# Patient Record
Sex: FEMALE | Race: WHITE | NOT HISPANIC OR LATINO | Employment: OTHER
[De-identification: names, ages, dates, MRNs, and addresses within clinical notes are randomized per-mention and may not be internally consistent; named-entity substitution may affect disease eponyms.]

---

## 2018-02-07 ENCOUNTER — RECORDS - HEALTHEAST (OUTPATIENT)
Dept: ADMINISTRATIVE | Facility: OTHER | Age: 83
End: 2018-02-07

## 2019-02-06 ENCOUNTER — RECORDS - HEALTHEAST (OUTPATIENT)
Dept: ADMINISTRATIVE | Facility: OTHER | Age: 84
End: 2019-02-06

## 2019-02-06 LAB
ALT SERPL W/O P-5'-P-CCNC: 26 U/L (ref 12–63)
AST SERPL-CCNC: 20 U/L (ref 10–37)
CHOLEST SERPL-MCNC: 175 MG/DL
CREAT SERPL-MCNC: 0.7 MG/DL (ref 0.6–1.3)
GFR ESTIMATE EXT - HISTORICAL: >60 ML/MIN/1.73M2
HDLC SERPL-MCNC: 94 MG/DL
LDLC SERPL CALC-MCNC: 69 MG/DL
NON HDL CHOL. (LDL+VLDL): 81
TRIGLYCERIDES (HISTORICAL CONVERSION): 58 MG/DL

## 2019-02-13 ENCOUNTER — RECORDS - HEALTHEAST (OUTPATIENT)
Dept: ADMINISTRATIVE | Facility: OTHER | Age: 84
End: 2019-02-13

## 2019-07-24 ENCOUNTER — RECORDS - HEALTHEAST (OUTPATIENT)
Dept: ADMINISTRATIVE | Facility: OTHER | Age: 84
End: 2019-07-24

## 2019-08-07 ENCOUNTER — RECORDS - HEALTHEAST (OUTPATIENT)
Dept: ADMINISTRATIVE | Facility: OTHER | Age: 84
End: 2019-08-07

## 2019-09-04 ENCOUNTER — RECORDS - HEALTHEAST (OUTPATIENT)
Dept: ADMINISTRATIVE | Facility: OTHER | Age: 84
End: 2019-09-04

## 2020-03-05 ENCOUNTER — COMMUNICATION - HEALTHEAST (OUTPATIENT)
Dept: INTERNAL MEDICINE | Facility: CLINIC | Age: 85
End: 2020-03-05

## 2020-03-19 ENCOUNTER — OFFICE VISIT - HEALTHEAST (OUTPATIENT)
Dept: INTERNAL MEDICINE | Facility: CLINIC | Age: 85
End: 2020-03-19

## 2020-03-19 DIAGNOSIS — I10 ESSENTIAL HYPERTENSION: ICD-10-CM

## 2020-03-19 DIAGNOSIS — R19.7 DIARRHEA, UNSPECIFIED TYPE: ICD-10-CM

## 2020-03-19 DIAGNOSIS — M79.675 PAIN OF TOE OF LEFT FOOT: ICD-10-CM

## 2020-03-19 DIAGNOSIS — E78.2 MIXED HYPERLIPIDEMIA: ICD-10-CM

## 2020-03-19 DIAGNOSIS — L85.8 KERATOACANTHOMA: ICD-10-CM

## 2020-03-19 DIAGNOSIS — Z76.89 ENCOUNTER TO ESTABLISH CARE WITH NEW DOCTOR: ICD-10-CM

## 2020-03-19 DIAGNOSIS — Z78.9 NONSMOKER: ICD-10-CM

## 2020-03-19 DIAGNOSIS — N32.81 OAB (OVERACTIVE BLADDER): ICD-10-CM

## 2020-03-19 LAB
ALBUMIN SERPL-MCNC: 4.2 G/DL (ref 3.5–5)
ALP SERPL-CCNC: 100 U/L (ref 45–120)
ALT SERPL W P-5'-P-CCNC: 27 U/L (ref 0–45)
ANION GAP SERPL CALCULATED.3IONS-SCNC: 12 MMOL/L (ref 5–18)
AST SERPL W P-5'-P-CCNC: 30 U/L (ref 0–40)
BASOPHILS # BLD AUTO: 0 THOU/UL (ref 0–0.2)
BASOPHILS NFR BLD AUTO: 1 % (ref 0–2)
BILIRUB SERPL-MCNC: 0.9 MG/DL (ref 0–1)
BUN SERPL-MCNC: 13 MG/DL (ref 8–28)
C REACTIVE PROTEIN LHE: 0.3 MG/DL (ref 0–0.8)
CALCIUM SERPL-MCNC: 9.2 MG/DL (ref 8.5–10.5)
CHLORIDE BLD-SCNC: 94 MMOL/L (ref 98–107)
CHOLEST SERPL-MCNC: 174 MG/DL
CO2 SERPL-SCNC: 26 MMOL/L (ref 22–31)
CREAT SERPL-MCNC: 0.67 MG/DL (ref 0.6–1.1)
EOSINOPHIL # BLD AUTO: 0.1 THOU/UL (ref 0–0.4)
EOSINOPHIL NFR BLD AUTO: 2 % (ref 0–6)
ERYTHROCYTE [DISTWIDTH] IN BLOOD BY AUTOMATED COUNT: 10.6 % (ref 11–14.5)
ERYTHROCYTE [SEDIMENTATION RATE] IN BLOOD BY WESTERGREN METHOD: 8 MM/HR (ref 0–20)
FASTING STATUS PATIENT QL REPORTED: NO
GFR SERPL CREATININE-BSD FRML MDRD: >60 ML/MIN/1.73M2
GLUCOSE BLD-MCNC: 97 MG/DL (ref 70–125)
HCT VFR BLD AUTO: 43 % (ref 35–47)
HDLC SERPL-MCNC: 95 MG/DL
HGB BLD-MCNC: 14.6 G/DL (ref 12–16)
LDLC SERPL CALC-MCNC: 70 MG/DL
LYMPHOCYTES # BLD AUTO: 1.4 THOU/UL (ref 0.8–4.4)
LYMPHOCYTES NFR BLD AUTO: 22 % (ref 20–40)
MCH RBC QN AUTO: 33.6 PG (ref 27–34)
MCHC RBC AUTO-ENTMCNC: 33.9 G/DL (ref 32–36)
MCV RBC AUTO: 99 FL (ref 80–100)
MONOCYTES # BLD AUTO: 0.3 THOU/UL (ref 0–0.9)
MONOCYTES NFR BLD AUTO: 5 % (ref 2–10)
NEUTROPHILS # BLD AUTO: 4.2 THOU/UL (ref 2–7.7)
NEUTROPHILS NFR BLD AUTO: 70 % (ref 50–70)
PLATELET # BLD AUTO: 286 THOU/UL (ref 140–440)
PMV BLD AUTO: 7.3 FL (ref 7–10)
POTASSIUM BLD-SCNC: 4.1 MMOL/L (ref 3.5–5)
PROT SERPL-MCNC: 7.1 G/DL (ref 6–8)
RBC # BLD AUTO: 4.34 MILL/UL (ref 3.8–5.4)
SODIUM SERPL-SCNC: 132 MMOL/L (ref 136–145)
TRIGL SERPL-MCNC: 45 MG/DL
URATE SERPL-MCNC: 3.1 MG/DL (ref 2–7.5)
WBC: 6.1 THOU/UL (ref 4–11)

## 2020-03-19 ASSESSMENT — MIFFLIN-ST. JEOR: SCORE: 939.35

## 2020-03-24 ENCOUNTER — COMMUNICATION - HEALTHEAST (OUTPATIENT)
Dept: INTERNAL MEDICINE | Facility: CLINIC | Age: 85
End: 2020-03-24

## 2020-03-24 RX ORDER — LOPERAMIDE HCL 2 MG
2 CAPSULE ORAL
Status: SHIPPED | COMMUNITY
Start: 2020-03-24

## 2020-03-24 RX ORDER — ROSUVASTATIN CALCIUM 5 MG/1
5 TABLET, COATED ORAL
Qty: 36 TABLET | Refills: 3 | Status: SHIPPED | OUTPATIENT
Start: 2020-03-25 | End: 2021-12-14

## 2020-03-24 RX ORDER — ASPIRIN 81 MG/1
81 TABLET, CHEWABLE ORAL DAILY
Status: SHIPPED | COMMUNITY
Start: 2020-03-24

## 2020-03-25 LAB
TTG IGA SER-ACNC: 0.4 U/ML
TTG IGG SER-ACNC: <0.6 U/ML

## 2020-07-28 ENCOUNTER — OFFICE VISIT - HEALTHEAST (OUTPATIENT)
Dept: INTERNAL MEDICINE | Facility: CLINIC | Age: 85
End: 2020-07-28

## 2020-07-28 DIAGNOSIS — I10 ESSENTIAL HYPERTENSION: ICD-10-CM

## 2020-07-28 DIAGNOSIS — Z01.810 PREOPERATIVE CARDIOVASCULAR EXAMINATION: ICD-10-CM

## 2020-07-28 DIAGNOSIS — M54.6 ACUTE MIDLINE THORACIC BACK PAIN: ICD-10-CM

## 2020-07-28 DIAGNOSIS — N32.81 OAB (OVERACTIVE BLADDER): ICD-10-CM
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
## 2020-07-28 DIAGNOSIS — E78.2 MIXED HYPERLIPIDEMIA: ICD-10-CM

## 2020-07-28 DIAGNOSIS — H25.13 AGE-RELATED NUCLEAR CATARACT OF BOTH EYES: ICD-10-CM

## 2020-07-28 RX ORDER — PREDNISOLONE ACETATE 10 MG/ML
SUSPENSION/ DROPS OPHTHALMIC
Status: SHIPPED | COMMUNITY
Start: 2020-07-14 | End: 2021-08-23

## 2020-07-28 ASSESSMENT — MIFFLIN-ST. JEOR: SCORE: 908.39

## 2020-09-03 ENCOUNTER — OFFICE VISIT - HEALTHEAST (OUTPATIENT)
Dept: INTERNAL MEDICINE | Facility: CLINIC | Age: 85
End: 2020-09-03

## 2020-09-03 DIAGNOSIS — M54.6 ACUTE MIDLINE THORACIC BACK PAIN: ICD-10-CM

## 2020-09-03 DIAGNOSIS — R07.89 CHEST WALL PAIN: ICD-10-CM

## 2020-09-03 DIAGNOSIS — Z00.00 MEDICARE ANNUAL WELLNESS VISIT, SUBSEQUENT: ICD-10-CM

## 2020-09-03 DIAGNOSIS — N32.81 OAB (OVERACTIVE BLADDER): ICD-10-CM

## 2020-09-03 DIAGNOSIS — Z78.9 NONSMOKER: ICD-10-CM

## 2020-09-03 DIAGNOSIS — I10 ESSENTIAL HYPERTENSION: ICD-10-CM
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
## 2020-09-03 DIAGNOSIS — E78.2 MIXED HYPERLIPIDEMIA: ICD-10-CM

## 2020-09-03 ASSESSMENT — PATIENT HEALTH QUESTIONNAIRE - PHQ9: SUM OF ALL RESPONSES TO PHQ QUESTIONS 1-9: 0

## 2020-09-03 ASSESSMENT — MIFFLIN-ST. JEOR: SCORE: 917.46

## 2020-09-04 ENCOUNTER — RECORDS - HEALTHEAST (OUTPATIENT)
Dept: ADMINISTRATIVE | Facility: OTHER | Age: 85
End: 2020-09-04

## 2020-09-22 ENCOUNTER — COMMUNICATION - HEALTHEAST (OUTPATIENT)
Dept: INTERNAL MEDICINE | Facility: CLINIC | Age: 85
End: 2020-09-22

## 2020-09-30 ENCOUNTER — RECORDS - HEALTHEAST (OUTPATIENT)
Dept: HEALTH INFORMATION MANAGEMENT | Facility: CLINIC | Age: 85
End: 2020-09-30

## 2021-02-04 ENCOUNTER — COMMUNICATION - HEALTHEAST (OUTPATIENT)
Dept: INTERNAL MEDICINE | Facility: CLINIC | Age: 86
End: 2021-02-04

## 2021-02-15 ENCOUNTER — COMMUNICATION - HEALTHEAST (OUTPATIENT)
Dept: INTERNAL MEDICINE | Facility: CLINIC | Age: 86
End: 2021-02-15

## 2021-02-15 DIAGNOSIS — N32.81 OAB (OVERACTIVE BLADDER): ICD-10-CM

## 2021-02-15 DIAGNOSIS — I10 ESSENTIAL HYPERTENSION: ICD-10-CM

## 2021-02-15 RX ORDER — CHLORTHALIDONE 25 MG/1
12.5 TABLET ORAL DAILY
Qty: 45 TABLET | Refills: 3 | Status: SHIPPED | OUTPATIENT
Start: 2021-02-15 | End: 2022-03-08

## 2021-02-15 RX ORDER — SOLIFENACIN SUCCINATE 5 MG/1
5 TABLET, FILM COATED ORAL DAILY
Qty: 90 TABLET | Refills: 3 | Status: SHIPPED | OUTPATIENT
Start: 2021-02-15 | End: 2021-08-23

## 2021-02-15 RX ORDER — AMLODIPINE AND BENAZEPRIL HYDROCHLORIDE 5; 20 MG/1; MG/1
1 CAPSULE ORAL DAILY
Qty: 90 CAPSULE | Refills: 3 | Status: SHIPPED | OUTPATIENT
Start: 2021-02-15 | End: 2022-03-08

## 2021-03-04 ENCOUNTER — OFFICE VISIT - HEALTHEAST (OUTPATIENT)
Dept: INTERNAL MEDICINE | Facility: CLINIC | Age: 86
End: 2021-03-04

## 2021-03-04 ENCOUNTER — AMBULATORY - HEALTHEAST (OUTPATIENT)
Dept: LAB | Facility: CLINIC | Age: 86
End: 2021-03-04
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
## 2021-03-04 DIAGNOSIS — R10.9 ABDOMINAL DISCOMFORT: ICD-10-CM

## 2021-03-04 DIAGNOSIS — M54.6 CHRONIC MIDLINE THORACIC BACK PAIN: ICD-10-CM

## 2021-03-04 DIAGNOSIS — N32.81 OAB (OVERACTIVE BLADDER): ICD-10-CM

## 2021-03-04 DIAGNOSIS — M54.6 ACUTE MIDLINE THORACIC BACK PAIN: ICD-10-CM

## 2021-03-04 DIAGNOSIS — E78.2 MIXED HYPERLIPIDEMIA: ICD-10-CM

## 2021-03-04 DIAGNOSIS — G89.29 CHRONIC MIDLINE THORACIC BACK PAIN: ICD-10-CM

## 2021-03-04 DIAGNOSIS — R07.9 CHEST PAIN, UNSPECIFIED TYPE: ICD-10-CM

## 2021-03-04 DIAGNOSIS — R14.0 ABDOMINAL BLOATING: ICD-10-CM

## 2021-03-04 DIAGNOSIS — I10 ESSENTIAL HYPERTENSION: ICD-10-CM

## 2021-03-04 LAB
ALBUMIN SERPL-MCNC: 4 G/DL (ref 3.5–5)
ALP SERPL-CCNC: 131 U/L (ref 45–120)
ALT SERPL W P-5'-P-CCNC: 38 U/L (ref 0–45)
ANION GAP SERPL CALCULATED.3IONS-SCNC: 12 MMOL/L (ref 5–18)
AST SERPL W P-5'-P-CCNC: 32 U/L (ref 0–40)
BILIRUB SERPL-MCNC: 0.5 MG/DL (ref 0–1)
BUN SERPL-MCNC: 18 MG/DL (ref 8–28)
CALCIUM SERPL-MCNC: 9.1 MG/DL (ref 8.5–10.5)
CHLORIDE BLD-SCNC: 97 MMOL/L (ref 98–107)
CHOLEST SERPL-MCNC: 175 MG/DL
CO2 SERPL-SCNC: 27 MMOL/L (ref 22–31)
CREAT SERPL-MCNC: 0.71 MG/DL (ref 0.6–1.1)
ERYTHROCYTE [DISTWIDTH] IN BLOOD BY AUTOMATED COUNT: 12.3 % (ref 11–14.5)
FASTING STATUS PATIENT QL REPORTED: NO
GFR SERPL CREATININE-BSD FRML MDRD: >60 ML/MIN/1.73M2
GLUCOSE BLD-MCNC: 94 MG/DL (ref 70–125)
HCT VFR BLD AUTO: 41.6 % (ref 35–47)
HDLC SERPL-MCNC: 84 MG/DL
HGB BLD-MCNC: 14.2 G/DL (ref 12–16)
LDLC SERPL CALC-MCNC: 75 MG/DL
MCH RBC QN AUTO: 33.2 PG (ref 27–34)
MCHC RBC AUTO-ENTMCNC: 34.1 G/DL (ref 32–36)
MCV RBC AUTO: 97 FL (ref 80–100)
PLATELET # BLD AUTO: 270 THOU/UL (ref 140–440)
PMV BLD AUTO: 8.7 FL (ref 7–10)
POTASSIUM BLD-SCNC: 4.2 MMOL/L (ref 3.5–5)
PROT SERPL-MCNC: 6.9 G/DL (ref 6–8)
RBC # BLD AUTO: 4.28 MILL/UL (ref 3.8–5.4)
SODIUM SERPL-SCNC: 136 MMOL/L (ref 136–145)
TRIGL SERPL-MCNC: 80 MG/DL
WBC: 7.1 THOU/UL (ref 4–11)

## 2021-03-04 ASSESSMENT — MIFFLIN-ST. JEOR: SCORE: 954.32

## 2021-03-08 ENCOUNTER — COMMUNICATION - HEALTHEAST (OUTPATIENT)
Dept: INTERNAL MEDICINE | Facility: CLINIC | Age: 86
End: 2021-03-08

## 2021-03-19 ENCOUNTER — COMMUNICATION - HEALTHEAST (OUTPATIENT)
Dept: INTERNAL MEDICINE | Facility: CLINIC | Age: 86
End: 2021-03-19

## 2021-03-23 ENCOUNTER — RECORDS - HEALTHEAST (OUTPATIENT)
Dept: ADMINISTRATIVE | Facility: OTHER | Age: 86
End: 2021-03-23

## 2021-03-25 ENCOUNTER — COMMUNICATION - HEALTHEAST (OUTPATIENT)
Dept: INTERNAL MEDICINE | Facility: CLINIC | Age: 86
End: 2021-03-25

## 2021-03-26 ENCOUNTER — COMMUNICATION - HEALTHEAST (OUTPATIENT)
Dept: INTERNAL MEDICINE | Facility: CLINIC | Age: 86
End: 2021-03-26

## 2021-04-02 ENCOUNTER — COMMUNICATION - HEALTHEAST (OUTPATIENT)
Dept: SCHEDULING | Facility: CLINIC | Age: 86
End: 2021-04-02

## 2021-04-02 DIAGNOSIS — R10.13 ABDOMINAL PAIN, EPIGASTRIC: ICD-10-CM
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
## 2021-04-12 ENCOUNTER — HOSPITAL ENCOUNTER (OUTPATIENT)
Dept: ULTRASOUND IMAGING | Facility: HOSPITAL | Age: 86
Discharge: HOME | End: 2021-04-12
Attending: INTERNAL MEDICINE

## 2021-04-12 ENCOUNTER — COMMUNICATION - HEALTHEAST (OUTPATIENT)
Dept: INTERNAL MEDICINE | Facility: CLINIC | Age: 86
End: 2021-04-12

## 2021-04-12 DIAGNOSIS — M54.6 ACUTE MIDLINE THORACIC BACK PAIN: ICD-10-CM

## 2021-04-12 DIAGNOSIS — R10.13 ABDOMINAL PAIN, EPIGASTRIC: ICD-10-CM
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
## 2021-04-12 DIAGNOSIS — R07.9 CHEST PAIN, UNSPECIFIED TYPE: ICD-10-CM
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
## 2021-04-12 DIAGNOSIS — Z92.89: ICD-10-CM

## 2021-05-04 ENCOUNTER — HOSPITAL ENCOUNTER (OUTPATIENT)
Dept: CT IMAGING | Facility: HOSPITAL | Age: 86
Discharge: HOME | End: 2021-05-04
Attending: INTERNAL MEDICINE

## 2021-05-04 ENCOUNTER — COMMUNICATION - HEALTHEAST (OUTPATIENT)
Dept: INTERNAL MEDICINE | Facility: CLINIC | Age: 86
End: 2021-05-04

## 2021-05-04 DIAGNOSIS — Z92.89: ICD-10-CM

## 2021-05-04 DIAGNOSIS — M54.6 ACUTE MIDLINE THORACIC BACK PAIN: ICD-10-CM
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
## 2021-05-04 DIAGNOSIS — R07.9 CHEST PAIN, UNSPECIFIED TYPE: ICD-10-CM

## 2021-05-11 ENCOUNTER — OFFICE VISIT - HEALTHEAST (OUTPATIENT)
Dept: INTERNAL MEDICINE | Facility: CLINIC | Age: 86
End: 2021-05-11
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
## 2021-05-11 DIAGNOSIS — M54.6 ACUTE MIDLINE THORACIC BACK PAIN: ICD-10-CM

## 2021-05-11 DIAGNOSIS — R07.9 CHEST PAIN, UNSPECIFIED TYPE: ICD-10-CM

## 2021-05-11 DIAGNOSIS — S22.060A CLOSED WEDGE COMPRESSION FRACTURE OF T7 VERTEBRA, INITIAL ENCOUNTER (H): ICD-10-CM

## 2021-05-18 ENCOUNTER — COMMUNICATION - HEALTHEAST (OUTPATIENT)
Dept: INTERNAL MEDICINE | Facility: CLINIC | Age: 86
End: 2021-05-18

## 2021-05-18 DIAGNOSIS — M54.6 ACUTE MIDLINE THORACIC BACK PAIN: ICD-10-CM
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
## 2021-05-18 DIAGNOSIS — S22.060A CLOSED WEDGE COMPRESSION FRACTURE OF T7 VERTEBRA, INITIAL ENCOUNTER (H): ICD-10-CM

## 2021-05-27 ASSESSMENT — PATIENT HEALTH QUESTIONNAIRE - PHQ9: SUM OF ALL RESPONSES TO PHQ QUESTIONS 1-9: 0

## 2021-06-01 ENCOUNTER — RECORDS - HEALTHEAST (OUTPATIENT)
Dept: ADMINISTRATIVE | Facility: OTHER | Age: 86
End: 2021-06-01

## 2021-06-04 VITALS
HEART RATE: 81 BPM | HEIGHT: 62 IN | BODY MASS INDEX: 21.35 KG/M2 | DIASTOLIC BLOOD PRESSURE: 80 MMHG | OXYGEN SATURATION: 98 % | SYSTOLIC BLOOD PRESSURE: 136 MMHG | WEIGHT: 116 LBS | TEMPERATURE: 97.5 F

## 2021-06-04 VITALS
SYSTOLIC BLOOD PRESSURE: 136 MMHG | HEART RATE: 72 BPM | DIASTOLIC BLOOD PRESSURE: 76 MMHG | HEIGHT: 62 IN | WEIGHT: 123.7 LBS | TEMPERATURE: 97.4 F | OXYGEN SATURATION: 95 % | RESPIRATION RATE: 14 BRPM | BODY MASS INDEX: 22.76 KG/M2

## 2021-06-04 VITALS
SYSTOLIC BLOOD PRESSURE: 144 MMHG | HEIGHT: 62 IN | DIASTOLIC BLOOD PRESSURE: 86 MMHG | OXYGEN SATURATION: 98 % | WEIGHT: 118 LBS | HEART RATE: 79 BPM | BODY MASS INDEX: 21.71 KG/M2

## 2021-06-05 VITALS
WEIGHT: 127 LBS | HEIGHT: 62 IN | BODY MASS INDEX: 23.37 KG/M2 | DIASTOLIC BLOOD PRESSURE: 76 MMHG | OXYGEN SATURATION: 98 % | HEART RATE: 76 BPM | SYSTOLIC BLOOD PRESSURE: 142 MMHG

## 2021-06-06 NOTE — TELEPHONE ENCOUNTER
New Appointment Needed  What is the reason for the visit:    Patient's son discussed Dr. Ashley seeing his mother Sandra Modi.  I cannot schedule this appointment as Dr. Ashley is not accepting new patients.  She claims that her son has already spoken to Dr. Ashley has agreed   Provider Preference: PCP only  How soon do you need to be seen?: Whenever she can be scheduled for a new patient physical if Dr Ashley has approved.  Waitlist offered?: No  Okay to leave a detailed message:  Yes

## 2021-06-06 NOTE — TELEPHONE ENCOUNTER
Attempted to call pt.    Due to COVID 19 out break Dr. Fenton would like to know pt would like to reschedule appointment.    If pt would like to reschedule please inform clinic because Dr. Fenton would like to use a Thursday am or Monday afternoon for intake if that works out for them and make sure I have an hour for intake.

## 2021-06-06 NOTE — TELEPHONE ENCOUNTER
Asked pt if she would like to keep her apportionment due to COVID 19 outbreak.    Pt states she would still like to keep her appointment because there is a spot on her face that reminds her of skin cancer and to have some medications refilled.     Pt thanked for the call.

## 2021-06-06 NOTE — TELEPHONE ENCOUNTER
Okay to schedule.    May use a Thursday am or Monday afternoon for intake if that works out for them and make sure I have an hour for intake.    Rufino Fenton MD  General Internal Medicine  [facility]  3/5/2020, 2:11 PM

## 2021-06-07 NOTE — PATIENT INSTRUCTIONS - HE
Please follow up if you have any further issues.    You may contact me by phone or [email or web address] if you are worsening or if things are not improving.    Thank you for coming in today.

## 2021-06-10 NOTE — PATIENT INSTRUCTIONS - HE
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
## 2021-06-11 NOTE — PROGRESS NOTES
Wt Readings from Last 20 Encounters:   09/03/20 118 lb (53.5 kg)   07/28/20 116 lb (52.6 kg)   03/19/20 123 lb 11.2 oz (56.1 kg)     The patient reports that she drinks more than one alcoholic drink per day but denies binge or excessive drinking. She was counseled and given information about possible harmful effects of excessive alcohol intake.  The patient reports that she has difficulty with instrumental activities of daily living.  She was provided with a list of local organizations that provide support services and advised to make a follow up appointment in 26 weeks to address this further.   Information on urinary incontinence and treatment options given to patient.  She is at risk for falling and has been provided with information to reduce the risk of falling at home.  Patient's advanced directive was discussed and I am comfortable with the patient's wishes.         Billing Type: Patient Bill

## 2021-06-11 NOTE — TELEPHONE ENCOUNTER
Called to patient- for the most part it is doing pretty well. Little pain routinely. If pain is present, lies down for 10 min and it goes away. Noticed lately sharp pain in middle of back left of spine that does not last long but frequent- doesn't keep her from doing anything. No triggers but it randomly comes and goes the last week. Thinks its coming along very well. Little of the original pain, improving. Went camping a week ago and had no issues.    May trigger when bending over such as doing dishes in a deep sink other then that she said it is a lot better and almost to the point where she can just ignore the pain.    She appreciated the call and checking in.

## 2021-06-15 NOTE — PATIENT INSTRUCTIONS - HE
Please follow up if you have any further issues.    You may contact me by phone or MyChart if you are worsening or if things are not improving.    ______________________________________________________________________     Please remember that you can call [phone] to schedule an appointment.     You can schedule appointments 24 hours a day, 7 days a week.  Sometimes the best time to schedule an appointment is after clinic hours when less people are calling in.  Weekends are another option for calling in to schedule appointments.

## 2021-06-16 PROBLEM — N32.81 OAB (OVERACTIVE BLADDER): Status: ACTIVE | Noted: 2020-03-24

## 2021-06-16 PROBLEM — S22.060A CLOSED WEDGE COMPRESSION FRACTURE OF T7 VERTEBRA, INITIAL ENCOUNTER (H): Status: ACTIVE | Noted: 2021-05-11
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
## 2021-06-16 NOTE — TELEPHONE ENCOUNTER
Informed pt of Dr. Fenton's message, she states an understanding.  Pt okay with having ultrasound done at [facility].  She thanked for the call.

## 2021-06-16 NOTE — TELEPHONE ENCOUNTER
Call back.    Okay to cancel orders.    We are trying to order through [facility] ([facility] instead.    Please thank them for trying to help this patient.    Rufino Fenton MD  General Internal Medicine  [facility]  3/26/2021, 10:15 AM

## 2021-06-16 NOTE — TELEPHONE ENCOUNTER
Called and spoke with Natty, advised to cancel the order    Natty expressed understanding    Yi Schwartz, CMA

## 2021-06-16 NOTE — TELEPHONE ENCOUNTER
Franki calling back in regards to request for CT ABD/Chest has been denied. They are looking for results of an ultrasound first. Please f/u if you have any further questions  [phone] opt [phone]

## 2021-06-16 NOTE — TELEPHONE ENCOUNTER
Dr. Jossy Luz calling from [facility] in [place].  She said that she got orders for CT Chest, abdomin, pelvis and thoracic spine.  Her insurance is denying them.  The insurance said that she needs additional imaging and xrays done before they will cover this.  If you would like to do a Peer to Peer the number is 1  opt 4.  Case number is [id number].  If you do the peer to peer please call Natty back and let her know this is done.  [phone].  Fax number .

## 2021-06-16 NOTE — TELEPHONE ENCOUNTER
Informed pt of Dr. Fenton's message.  She states an understanding.  Pt would like to try to get CT scan.  Pt thanked for the call.

## 2021-06-16 NOTE — TELEPHONE ENCOUNTER
Okay to proceed.    Rufino Fenton MD  General Internal Medicine  [facility]  3/25/2021, 11:18 AM

## 2021-06-16 NOTE — TELEPHONE ENCOUNTER
Dr. Jossy Luz calling from calling the business at [facility].  They received 3 orders for CT scans, and they are all being denied by her insurance co.  She is wondering if you want to do a peer to peer with the insurance co.  The number for the insurance co is 7  .  Member ID number is [id number] case ID Number is  [id number].[employer]  If you do get an Authorization from them Natty needs Authorization numbers and the Authorization dates/  Any questions please call Natty back at .

## 2021-06-16 NOTE — TELEPHONE ENCOUNTER
Patient calling back in regards to conversation below with Wilbur LINO  Patient stating that because of the US that was done yesterday she would like to move forward with just the CT of Chest at this time if Dr Fenton believes that's appropriate.  She does not think the CT Abdomen is needed after the US came back yesterday with no findings.      Any questions, please call patient back at [phone].

## 2021-06-16 NOTE — TELEPHONE ENCOUNTER
Please call patient -    ______________________________________________________________________     Home phone:  [phone] (home)     Cell phone:   No relevant phone numbers on file.       Other contacts:  Name Home Phone Work Phone Mobile Phone Relationship Lgl KingstonMUMTAZ Fountain   [phone] Child      ______________________________________________________________________     Her insurance is still denying doing the CT scans at this time until we do an ultrasound of her abdomen to look for things first.    I am going to place this order through [facility] - confirm this is okay for her.    We might be able to get the CT scan done after that.    Rufino Fenton MD  [facility]  4/4/2021, 8:43 PM

## 2021-06-16 NOTE — TELEPHONE ENCOUNTER
Please notify the patient -    Her insurance is refusing to cover the CT scan at [facility].    I would like to do this through [facility] ([facility] instead as the insurance would likely cover this better then.  We might also need to do her chest and upper belly at first as they might also be having issues as I ordered 3 separate scans.    If she is okay with this, then I will put through an order.    Thank you,    Rufino Fenton MD  General Internal Medicine  [facility]  3/22/2021, 4:23 PM

## 2021-06-16 NOTE — TELEPHONE ENCOUNTER
Called and relayed message patient. Patient given information for the [facility] location. She is fine with however these are ordered.

## 2021-06-16 NOTE — TELEPHONE ENCOUNTER
Dr. Jossy Montemayor from [facility] calling.  They received an order for a CT without contrast.  They are wondering if they can do Contrast per Radiologist recommendation.   Please call Albina back at .

## 2021-06-17 NOTE — TELEPHONE ENCOUNTER
Pt returned call.  Message relayed.  Pt decided to schedule virtual telephone visit Tuesday, May 11th.
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
## 2021-06-17 NOTE — TELEPHONE ENCOUNTER
Telephone Encounter by Wilbur Munoz CMA at 2/15/2021 11:41 AM     Author: Wilbur Munoz CMA Service: -- Author Type: Certified Medical Assistant    Filed: 2/15/2021 11:44 AM Encounter Date: 2/15/2021 Status: Signed    : Wilbur Munoz CMA (Certified Medical Assistant)       Pharmacy requesting refill of Amlodipine, chlorthalidone (HYGROTEN), solifenacin (VESICARE)     Pt last seen 9/3/20  Pt due to be seen around 3/3/21  Plan:      1. We will request records from [facility].  2. She will continue the same medications.  3. We could consider a CT scan of the chest abdomen and pelvis if needed here in the future.  We reviewed this with her.  4. She has been frustrated with the phone system in our clinic and I gave her the phone number for patient advocacy           Rufino Fenton MD  General Internal Medicine  [facility]    Personal office fax - [phone]   Voice mail - [phone]  E-mail - [email or web address]      Return in about 6 months (around 3/3/2021) for follow up visit.

## 2021-06-17 NOTE — PATIENT INSTRUCTIONS - HE
Please follow up if you have any further issues.    You may contact me by phone or MyChart if you are worsening or if things are not improving.    ______________________________________________________________________     Please remember that you can call [phone] to schedule an appointment.     You can schedule appointments 24 hours a day, 7 days a week.  Sometimes the best time to schedule an appointment is after clinic hours when less people are calling in.  Weekends are another option for calling in to schedule appointments.        ______________________________________________________________________      No future appointments.

## 2021-06-17 NOTE — TELEPHONE ENCOUNTER
Called and lvm for patient to call back for message regarding results.    Please relay and assist as needed

## 2021-06-17 NOTE — TELEPHONE ENCOUNTER
I will fax a referral to the [facility] physical therapy you referenced.  Their phone number is [phone].    I am sorry your insurance has been so stubborn.  This has been a pattern with some insurances lately.  It obviously contributed to taking more time to find your diagnosis.     Take care,    Jas Fenton MD    From: Sandra Modi <[email or web address]   Sent: Tuesday, May 18, 2021 9:46 AM  To: Rufino Fenton <[email or web address]>  Subject: PT & Dr. Jossy Camp,    Since learning about the source of my continued discomfort, I wonder if some sort of physical therapy might help. Apparently I would need a doctor s prescription to start any PT.  Do you think that might be beneficial? What is your opinion?    I understand that there is a provider next door to Cheng [place] here in [place] ([facility]?), close enough that I could walk for sessions. Would you recommend them?    On another subject, I received notice from my insurance provider [employer] that they would not cover the ultrasound and CT scan. I don t know why they thought it had something to do with my lungs. However I ve received my bill for those procedures, and the cost is not high enough to warrant time and trouble of filing an appeal.  I ll simply pay them, and we can both forget about that issue.    Thank you for your advice.    Cam      --   Sandra Modi  [address] [place] [address]  [phone]  [email or web address]

## 2021-06-18 NOTE — PATIENT INSTRUCTIONS - HE
Patient Instructions by Rufino Fenton MD at 9/3/2020  4:35 PM     Author: Rufino Fenton MD Service: -- Author Type: Physician    Filed: 9/13/2020 10:11 PM Encounter Date: 9/3/2020 Status: Addendum    : Rufino Fenton MD (Physician)    Related Notes: Original Note by Rufino Fenton MD (Physician) filed at 9/3/2020  5:51 PM       If you would like to share a comment about the care you receive through [facility], please contact customer advocacy at [phone].    Patient Education   Alcohol Use   Many people can enjoy a glass of wine or beer without any negative consequences to their health. According to the Centers for Disease Control and Prevention (CDC), having one or fewer drinks per day for women and two or fewer per day for men is considered moderate drinking.     When people drink more than moderately, it can become concerning. Excessive drinking is defined as consuming 15 drinks or more per week for men and 8 drinks or more per week for women. There are various health problems associated with excessive drinking, which include:    Damage to vital organs like the heart, brain, liver and pancreas    Harm to the digestive tract    Weaken the immune system    Higher risk for heart disease and cancer       Patient Education   Instrumental Activities of Daily Living  Your Health Risk Assessment indicates you have difficulties with instrumental activities of daily living which include laundry, housekeeping, banking, shopping, using the telephone, food preparation, transportation, or taking your own medications. Please make a follow up appointment for us to address this issue in more detail.    [facility] has resources available on the following website: [email or web address]     Also, here is a local agency that provides help with meals and other assistance:   [employer] Line: [phone]     Patient Education   Urinary Incontinence, Female (Adult)  Urinary incontinence  means loss of control of the bladder. This problem affects many women, especially as they get older. If you have incontinence, you may be embarrassed to ask for help. But know that this problem can be treated.  Types of Incontinence  There are different types of incontinence. Two of the main types are described here. You can have more than one type.    Stress incontinence. With this type, urine leaks when pressure (stress) is put on the bladder. This may happen when you cough, sneeze, or laugh. Stress incontinence most often occurs because the pelvic floor muscles that support the bladder and urethra are weak. This can happen after pregnancy and vaginal childbirth or a hysterectomy. It can also be due to excess body weight or hormone changes.    Urge incontinence (also called overactive bladder). With this type, a sudden urge to urinate is felt often. This may happen even though there may not be much urine in the bladder. The need to urinate often during the night is common. Urge incontinence most often occurs because of bladder spasms. This may be due to bladder irritation or infection. Damage to bladder nerves or pelvic muscles, constipation, and certain medicines can also lead to urge incontinence.  Treatment of urinary incontinence depends on the cause. Further evaluation is needed to find the type you have. This will likely include an exam and certain tests. Based on the results, you and your healthcare provider can then plan treatment. Until a diagnosis is made, the home care tips below can help relieve symptoms.  Home care    Do pelvic floor muscle exercises, if they are prescribed. The pelvic floor muscles help support the bladder and urethra. Many women find that their symptoms improve when doing special exercises that strengthen these muscles. To do the exercises contract the muscles you would use to stop your stream of urine, but do this when youre not urinating. Hold for 10 seconds, then relax. Repeat 10  to 20 times in a row, at least 3 times a day. Your provider may give you other instructions for how to do the exercises and how often.    Keep a bladder diary. This helps track how often and how much you urinate over a set period of time. Bring this diary with you to your next visit with the provider. The information can help your provider learn more about your bladder problem.    Lose weight, if advised to by your provider. Excess weight puts pressure on the bladder. Your provider can help you create a weight-loss plan thats right for you. This may include exercising more and making certain diet changes.    Don't consume foods and drinks that may irritate the bladder. These can include alcohol and caffeinated drinks.    Quit smoking. Smoking and other tobacco use can lead to chronic cough that strains the pelvic floor muscles. Smoking may also damage the bladder and urethra. Talk with your provider about treatments or methods you can use to quit smoking.    If drinking large amounts of fluid causes you to have symptoms, you may be advised to limit your fluid intake. You may also be advised to drink most of your fluids during the day and to limit fluids at night.    If youre worried about urine leakage or accidents, you may wear absorbent pads to catch urine. Change the pads often. This helps reduce discomfort. It may also reduce the risk of skin or bladder infections.  Follow-up care  Follow up with your healthcare provider, or as directed. It may take some to find the right treatment for your problem. Your treatment plan may include special therapies or medicines. Certain procedures or surgery may also be options. Be sure to discuss any questions you have with your provider.  When to seek medical advice  Call the healthcare provider right away if any of these occur:    Fever of 100.4 F (38 C) or higher, or as directed by your provider    Bladder pain or fullness    Abdominal swelling    Nausea or vomiting    Back  pain    Weakness, dizziness or fainting  Date Last Reviewed: 10/1/2017    5452-5692 [employer] [address] [place] [address]. All rights reserved. This information is not intended as a substitute for professional medical care. Always follow your healthcare professional's instructions.     Patient Education   Preventing Falls in the Home  As you get older, falls are more likely. Thats because your reaction time slows. Your muscles and joints may also get stiffer, making them less flexible. Illness, medications, and vision changes can also affect your balance. A fall could leave you unable to live on your own. To make your home safer, follow these tips:    Floors    Put nonskid pads under area rugs.    Remove throw rugs.    Replace worn floor coverings.    Tack carpets firmly to each step on carpeted stairs. Put nonskid strips on the edges of uncarpeted stairs.    Keep floors and stairs free of clutter and cords.    Arrange furniture so there are clear pathways.    Clean up any spills right away.    Bathrooms    Install grab bars in the tub or shower.    Apply nonskid strips or put a nonskid rubber mat in the tub or shower.    Sit on a bath chair to bathe.    Use bathmats with nonskid backing.    Lighting    Keep a flashlight in each room.    Put a nightlight along the pathway between the bedroom and the bathroom.    1742-3454 [employer]. [address] [place] [address]. All rights reserved. This information is not intended as a substitute for professional medical care. Always follow your healthcare professional's instructions.           Advance Directive  Patients advance directive was discussed and I am comfortable with the patients wishes.  Patient Education   Personalized Prevention Plan  You are due for the preventive services outlined below.  Your care team is available to assist you in scheduling these services.  If you have already completed any of these  items, please share that information with your care team to update in your medical record.  Health Maintenance   Topic Date Due   ? TD 18+ HE  08/11/1949   ? ADVANCE CARE PLANNING  08/11/1949   ? ZOSTER VACCINES (1 of 2) 08/11/1981   ? MEDICARE ANNUAL WELLNESS VISIT  08/11/1996   ? PNEUMOCOCCAL IMMUNIZATION 65+ LOW/MEDIUM RISK (1 of 2 - PCV13) 08/11/1996   ? DXA SCAN  08/11/1996   ? INFLUENZA VACCINE RULE BASED (1) 08/01/2020   ? FALL RISK ASSESSMENT  09/03/2021   ? HEPATITIS B VACCINES  Aged Out

## 2021-06-20 NOTE — LETTER
Letter by Rufino Fenton MD at      Author: Rufino Fenton MD Service: -- Author Type: --    Filed:  Encounter Date: 3/24/2020 Status: (Other)       3/24/2020    Sandra BOSTON Ly   [address]  [facility] [address]         Dear Sandra,    It was good to see you in clinic.  I hope your questions were answered at the time of your visit.    The results of your tests from your visit were as follows:    Resulted Orders   Uric Acid   Result Value Ref Range    Uric Acid 3.1 2.0 - 7.5 mg/dL   Sedimentation Rate   Result Value Ref Range    Sed Rate 8 0 - 20 mm/hr   Comprehensive Metabolic Panel   Result Value Ref Range    Sodium 132 (L) 136 - 145 mmol/L    Potassium 4.1 3.5 - 5.0 mmol/L    Chloride 94 (L) 98 - 107 mmol/L    CO2 26 22 - 31 mmol/L    Anion Gap, Calculation 12 5 - 18 mmol/L    Glucose 97 70 - 125 mg/dL    BUN 13 8 - 28 mg/dL    Creatinine 0.67 0.60 - 1.10 mg/dL    GFR MDRD Af Amer >60 >60 mL/min/1.73m2    GFR MDRD Non Af Amer >60 >60 mL/min/1.73m2    Bilirubin, Total 0.9 0.0 - 1.0 mg/dL    Calcium 9.2 8.5 - 10.5 mg/dL    Protein, Total 7.1 6.0 - 8.0 g/dL    Albumin 4.2 3.5 - 5.0 g/dL    Alkaline Phosphatase 100 45 - 120 U/L    AST 30 0 - 40 U/L    ALT 27 0 - 45 U/L    Narrative    Fasting Glucose reference range is 70-99 mg/dL per  American Diabetes Association (ADA) guidelines.   Lipid [place] RANDOM   Result Value Ref Range    Cholesterol 174 <=199 mg/dL    Triglycerides 45 <=149 mg/dL    HDL Cholesterol 95 >=50 mg/dL    LDL Calculated 70 <=129 mg/dL    Patient Fasting > 8hrs? No    C-Reactive Protein(CRP)   Result Value Ref Range    CRP 0.3 0.0 - 0.8 mg/dL   HM1 (CBC with Diff)   Result Value Ref Range    WBC 6.1 4.0 - 11.0 thou/uL    RBC 4.34 3.80 - 5.40 mill/uL    Hemoglobin 14.6 12.0 - 16.0 g/dL    Hematocrit 43.0 35.0 - 47.0 %    MCV 99 80 - 100 fL    MCH 33.6 27.0 - 34.0 pg    MCHC 33.9 32.0 - 36.0 g/dL    RDW 10.6 (L) 11.0 - 14.5 %    Platelets 286 140 - 440 thou/uL    MPV 7.3 7.0 - 10.0  fL    Neutrophils % 70 50 - 70 %    Lymphocytes % 22 20 - 40 %    Monocytes % 5 2 - 10 %    Eosinophils % 2 0 - 6 %    Basophils % 1 0 - 2 %    Neutrophils Absolute 4.2 2.0 - 7.7 thou/uL    Lymphocytes Absolute 1.4 0.8 - 4.4 thou/uL    Monocytes Absolute 0.3 0.0 - 0.9 thou/uL    Eosinophils Absolute 0.1 0.0 - 0.4 thou/uL    Basophils Absolute 0.0 0.0 - 0.2 thou/uL     Your blood counts are normal and you are not anemic.     Your markers of inflammation were normal.   Your uric acid level (a substance that can lead to gout) was good.     Your cholesterol is doing well.      Your liver and kidney tests are normal.  There are no signs of diabetes.     Your sodium is slightly low, and this is likely due to the chlorthalidone.  No changes need be made.    Please follow up again in as problems arise or at a minimum yearly.    If you have any questions regarding these results, please feel free to contact me at [phone].  I wish you the best of health!        Sincerely,      Rufino Fenton MD  General Internal Medicine  [facility]

## 2021-06-21 NOTE — LETTER
Letter by Rufino Fenton MD at      Author: Rufino Fenton MD Service: -- Author Type: --    Filed:  Encounter Date: 3/8/2021 Status: (Other)         3/8/2021    Sandra BOSTON Ly   [address]  [facility] [address]         Dear Sandra,    It was good to see you in clinic.  I hope your questions were answered at the time of your visit.    The results of your tests from your visit were as follows:    Resulted Orders   Comprehensive Metabolic Panel   Result Value Ref Range    Sodium 136 136 - 145 mmol/L    Potassium 4.2 3.5 - 5.0 mmol/L    Chloride 97 (L) 98 - 107 mmol/L    CO2 27 22 - 31 mmol/L    Anion Gap, Calculation 12 5 - 18 mmol/L    Glucose 94 70 - 125 mg/dL    BUN 18 8 - 28 mg/dL    Creatinine 0.71 0.60 - 1.10 mg/dL    GFR MDRD Af Amer >60 >60 mL/min/1.73m2    GFR MDRD Non Af Amer >60 >60 mL/min/1.73m2    Bilirubin, Total 0.5 0.0 - 1.0 mg/dL    Calcium 9.1 8.5 - 10.5 mg/dL    Protein, Total 6.9 6.0 - 8.0 g/dL    Albumin 4.0 3.5 - 5.0 g/dL    Alkaline Phosphatase 131 (H) 45 - 120 U/L    AST 32 0 - 40 U/L    ALT 38 0 - 45 U/L    Narrative    Fasting Glucose reference range is 70-99 mg/dL per  American Diabetes Association (ADA) guidelines.   Lipid [place] FASTING   Result Value Ref Range    Cholesterol 175 <=199 mg/dL    Triglycerides 80 <=149 mg/dL    HDL Cholesterol 84 >=50 mg/dL    LDL Calculated 75 <=129 mg/dL    Patient Fasting > 8hrs? No    HM2(CBC w/o Differential)   Result Value Ref Range    WBC 7.1 4.0 - 11.0 thou/uL    RBC 4.28 3.80 - 5.40 mill/uL    Hemoglobin 14.2 12.0 - 16.0 g/dL    Hematocrit 41.6 35.0 - 47.0 %    MCV 97 80 - 100 fL    MCH 33.2 27.0 - 34.0 pg    MCHC 34.1 32.0 - 36.0 g/dL    RDW 12.3 11.0 - 14.5 %    Platelets 270 140 - 440 thou/uL    MPV 8.7 7.0 - 10.0 fL     Your blood counts are normal and you are not anemic.     Your cholesterol is doing well.     Your kidney tests are normal.  Your electrolytes are also normal.  There is no signs of diabetes.  Your liver tests are  normal.      We will contact you by phone when we get your CT scan results back.    If you have any questions regarding these results, please feel free to contact me at [phone].  I wish you the best of health!      Sincerely,       Rufino Fenton MD  General Internal Medicine  [facility]

## 2021-06-21 NOTE — LETTER
Letter by Rufino Fenton MD at      Author: Rufino Fenton MD Service: -- Author Type: --    Filed:  Encounter Date: 3/4/2021 Status: (Other)         3/8/2021    Sandra Modi   [address]  [facility] [address]   : 1931       Patient prescription:      RX:  Radiology to be done at [facility]:  1.  CT scan of the thoracic spine without contrast - Diagnoses #1,4  2.  CT scan of the chest without contrast - Diagnoses #1, #4, #5  3.  CT scan of the abdomen and pelvis with oral and IV contrast - Diagnoses #1, 6, 7.    DX:      ICD-10-CM    1. Other injury due to other accident on board [employer] or [employer], initial encounter  V93.85XA    2. Essential hypertension  I10              3. Mixed hyperlipidemia  E78.2         4. Acute midline thoracic back pain  M54.6    5. Chest pain, unspecified type  R07.9    6. Abdominal discomfort  R10.9    7. Abdominal bloating  R14.0    8. Chronic midline thoracic back pain  M54.6     G89.29      Fax results to me at office fax - [phone]        Chemistry        Component Value Date/Time     2021 1553    K 4.2 2021 1553    CL 97 (L) 2021 1553    CO2 27 2021 1553    BUN 18 2021 1553    CREATININE 0.71 2021 1553    GLU 94 2021 1553        Component Value Date/Time    CALCIUM 9.1 2021 1553    ALKPHOS 131 (H) 2021 1553    AST 32 2021 1553    AST 20 2019    ALT 38 2021 1553    ALT 26 2019    BILITOT 0.5 2021 1553         Please fax this to [facility] at [phone].        Rufino Fenton MD  General Internal Medicine  [facility] [id number]    [place] License [id number]

## 2021-06-21 NOTE — LETTER
Letter by Rufino Fenton MD at      Author: Rufino Fenton MD Service: -- Author Type: --    Filed:  Encounter Date: 2/4/2021 Status: (Other)         Sandra Modi   [address]  [facility] [address]         Dear Sandra,    I am sending you this letter to remind you that you are due for a follow up visit in March.    Please call to schedule this visit as soon as possible as our schedule fills up quickly.    If you already have an appointment scheduled with me for around this time, please ignore this notification.    I look forward to seeing you to review your overall health.          Sincerely,       Rufino Fenton MD  General Internal Medicine  [facility]

## 2021-06-21 NOTE — LETTER
Letter by Rufino Fenton MD at      Author: Rufino Fenton MD Service: -- Author Type: --    Filed:  Encounter Date: 2021 Status: (Other)       2021    Sandra Modi   [address] [facility] [address]   : 1931       Patient prescription:      RX:  Physical therapy evaluation and treatment.    DX:      ICD-10-CM    1. Closed wedge compression fracture of T7 vertebra, initial encounter (H)  S22.060A    2. Acute midline thoracic back pain  M54.6    3. Other injury due to other accident on board [employer] or [email or web address], initial encounter  V93.85XA           Rufino Fenton MD  General Internal Medicine  [facility] [id number]    [place] License [id number]       ______________________________________________________________________     Patient Information    Patient Name   Sandra Modi ([id number]) Sex   Female    1931   Patient Language     Needed: No                           Spoken Language: English                           Written Language: English          Patient Demographics    Address   [address] [place] [address] Phone   [phone] (Home)   [phone] (Mobile) E-mail Address   [email or web address]   Currently Active Insurance    Payor Plan Ins. Group Subscriber Member ID   COMMERCIAL INSURANCE-GENERIC HE GENERIC COMMERCIAL [id number] SANDRA MODI [id number]   PCP and Center    Primary Care Provider Phone Center   Rufino Fenton [phone] [facility]   Emergency Contact(s)    Name Relation Home Work Mobile   MUMTAZ MODI Child   [phone]

## 2021-06-28 NOTE — PROGRESS NOTES
Progress Notes by Rufino Fenton MD at 3/19/2020 11:00 AM     Author: Rufino Fenton MD Service: -- Author Type: Physician    Filed: 3/24/2020  8:22 AM Encounter Date: 3/19/2020 Status: Signed    : Rufino Fenton MD (Physician)              [facility]    Comprehensive and complex medical care - Chronic disease management - Shared decision making - Care coordination - Compassionate care    Patient advocacy - Rational deprescribing - Minimally disruptive medicine - Ethical focus - Customized care          Date of Service: 3/19/2020  Primary Provider: Rufino Fenton MD    Patient Care Team:  Rufino Fenton MD as PCP - General (Internal Medicine)  Shree Kruger MD as Physician (Ophthalmology)     ______________________________________________________________________     [id number] [place], [place] [facility] [address]  [address] [facility] [address]  Phone: [phone] Fax: [phone]     Patient's Contacts:  Name Home Phone Work Phone Mobile Phone Relationship Lgl MUMTAZ Duke   [phone] Child    DECLINED, PER PT    Declined        Patient's Insurance:    Payor: COMMERCIAL INSURANCE-GENERIC HE / Plan: GENERIC COMMERCIAL / Product Type: PPO/POS/FFS /           Sandra LUDY Modi is a 88 y.o. female who comes in today for:     Chief Complaint   Patient presents with   ? Establish Care       Active Problem List:       Past Medical History:   Diagnosis Date   ? HLD (hyperlipidemia) 3/24/2020   ? HTN (hypertension)    ? Nonsmoker       Current Outpatient Medications   Medication Sig   ? aspirin 81 mg chewable tablet Chew 81 mg daily.   ? calcium carb/vit D3/minerals (CALCIUM-VITAMIN D ORAL) Take 1 tablet by mouth 2 (two) times a day.   ? loperamide (IMODIUM) 2 mg capsule Take 2 mg by mouth daily.   ? multivit-mins no.63/iron/folic (M-VIT ORAL) Take 1 tablet by mouth daily.   ? vit C/E/zinc ox/harrison/lut/zeax  (ICAPS AREDS2 ORAL) Take 1 tablet by mouth 2 (two) times a day.   ? amLODIPine-benazepril (LOTREL 5-20) 5-20 mg per capsule Take 1 capsule by mouth daily.   ? chlorthalidone (HYGROTEN) 25 MG tablet Take 0.5 tablets (12.5 mg total) by mouth daily.   ? [START ON 3/25/2020] rosuvastatin (CRESTOR) 5 MG tablet Take 1 tablet (5 mg total) by mouth 3 (three) times a week. Monday, Wednesday, and Friday.   ? solifenacin (VESICARE) 5 MG tablet Take 1 tablet (5 mg total) by mouth daily.     Social History     Occupational History   ? Occupation: [employer]     Employer: RETIRED     Comment: Masters in microbiology   Tobacco Use   ? Smoking status: Never Smoker   ? Smokeless tobacco: Never Used   Substance and Sexual Activity   ? Alcohol use: Not Currently   ? Drug use: Not on file   ? Sexual activity: Not on file      Social History     Social History Narrative    .  Lives in the [facility] near [facility].        Son is Dr. Vikas Modi, orthopedist at [facility].        Has 2 daughters who live in the [facility] area and 1 who lives in [place].        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  Previously lived in [place] ([phone]).        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      Family History   Problem Relation Age of Onset   ? Transient ischemic attack Mother    ? Hypertension Mother    ? Alzheimer's disease Father    ? Colon cancer Father    ? No Medical Problems Sister    ? No Medical Problems Son    ? No Medical Problems Daughter    ? No Medical Problems Daughter       Family history is otherwise noncontributory.    Subjective:     Patient comes in today as a new patient and to establish care.    She was living in [place] up until recently.  She is living in a townhome in a assisted living community.    She has longstanding high blood pressure.  She is on chlorthalidone and amlodipine benazepril for this.  She brings in some old records  to review from her previous doctor.  Although they are in epic, they cannot be connected through care everywhere at this point.  She has had no cardiac disease and no history of stroke.    She also has hyperlipidemia.  She takes a low dose of rosuvastatin for this.  She feels like it is going well for her.  She has seen cardiology remotely.    She has seen Dr. Kruger through [facility] and has some cataracts, but will likely be addressing these in the future.    She is had a history of skin cancer in the past.    She has an overactive bladder diagnosed 4 years ago.  She is on solifenacin for this.  It works well for her.  She denies any side effects with it.  She would like to have a refill of this.  She has seen urogynecology in the past for this.    She has a remote history of osteoporosis and was on Fosamax for a number of years.    She has a skin lesion on her left forehead which she would like to have looked at.  She noticed it about 6 weeks ago.  It did flareup more and had crusty center and now has been flattening at this point.  She wonders what it might be.    She has occasionally had visual migraine type phenomenon with flashing lights in her vision.  She has some issues with her left t foot big toe.  She has had some pain in this.  She was wondering about gout.  It seems to have settled down on its own.    She has intermittent diarrhea that bothers her off and on.  She uses Imodium for this.  She has had colonoscopy remotely.  We reviewed some swelling that she has had in her left thumb as well.  This is remotely.  It was thought that she might have had gout with this in the past as well.      We reviewed her other issues noted in the assessment but not specifically addressed in the HPI above.     The 10-system review of systems and health history form for [employer] was completed by patient, reviewed by me, and pertinent problems are reviewed above.     Objective:     Wt Readings from Last 3  "Encounters:   03/19/20 123 lb 11.2 oz (56.1 kg)     BP Readings from Last 3 Encounters:   03/19/20 136/76      /76   Pulse 72   Temp 97.4  F (36.3  C)   Resp 14   Ht 5' 2\" (1.575 m)   Wt 123 lb 11.2 oz (56.1 kg)   SpO2 95%   BMI 22.63 kg/m     The patient is comfortable, no acute distress.  Mood good.  Insight is good.  She has a 6 to 7 mm resolving keratoacanthoma of her left forehead.  Ears clear.  Eyes are nonicteric.  Pupils equal and reactive.  Throat is clear.  Neck is supple without mass, no thyromegaly.  Carotids are clear.  No cervical or epitrochlear adenopathy.  Heart regular rate and rhythm.  Lungs clear to auscultation bilaterally.  Respiratory effort good.  Abdomen soft and nontender.  No hepatosplenomegaly.  Extremities show no edema.  She does have a bunion that is quite prominent on her left foot without any signs of infection at this point.  There is no ulcerations.  There is no obvious signs of gout or tophi.  Her gait is good.      Diagnostics:     Results for orders placed or performed in visit on 03/19/20   Uric Acid   Result Value Ref Range    Uric Acid 3.1 2.0 - 7.5 mg/dL   Sedimentation Rate   Result Value Ref Range    Sed Rate 8 0 - 20 mm/hr   Comprehensive Metabolic Panel   Result Value Ref Range    Sodium 132 (L) 136 - 145 mmol/L    Potassium 4.1 3.5 - 5.0 mmol/L    Chloride 94 (L) 98 - 107 mmol/L    CO2 26 22 - 31 mmol/L    Anion Gap, Calculation 12 5 - 18 mmol/L    Glucose 97 70 - 125 mg/dL    BUN 13 8 - 28 mg/dL    Creatinine 0.67 0.60 - 1.10 mg/dL    GFR MDRD Af Amer >60 >60 mL/min/1.73m2    GFR MDRD Non Af Amer >60 >60 mL/min/1.73m2    Bilirubin, Total 0.9 0.0 - 1.0 mg/dL    Calcium 9.2 8.5 - 10.5 mg/dL    Protein, Total 7.1 6.0 - 8.0 g/dL    Albumin 4.2 3.5 - 5.0 g/dL    Alkaline Phosphatase 100 45 - 120 U/L    AST 30 0 - 40 U/L    ALT 27 0 - 45 U/L   Lipid Cascade RANDOM   Result Value Ref Range    Cholesterol 174 <=199 mg/dL    Triglycerides 45 <=149 mg/dL    HDL " Cholesterol 95 >=50 mg/dL    LDL Calculated 70 <=129 mg/dL    Patient Fasting > 8hrs? No    C-Reactive Protein(CRP)   Result Value Ref Range    CRP 0.3 0.0 - 0.8 mg/dL   HM1 (CBC with Diff)   Result Value Ref Range    WBC 6.1 4.0 - 11.0 thou/uL    RBC 4.34 3.80 - 5.40 mill/uL    Hemoglobin 14.6 12.0 - 16.0 g/dL    Hematocrit 43.0 35.0 - 47.0 %    MCV 99 80 - 100 fL    MCH 33.6 27.0 - 34.0 pg    MCHC 33.9 32.0 - 36.0 g/dL    RDW 10.6 (L) 11.0 - 14.5 %    Platelets 286 140 - 440 thou/uL    MPV 7.3 7.0 - 10.0 fL    Neutrophils % 70 50 - 70 %    Lymphocytes % 22 20 - 40 %    Monocytes % 5 2 - 10 %    Eosinophils % 2 0 - 6 %    Basophils % 1 0 - 2 %    Neutrophils Absolute 4.2 2.0 - 7.7 thou/uL    Lymphocytes Absolute 1.4 0.8 - 4.4 thou/uL    Monocytes Absolute 0.3 0.0 - 0.9 thou/uL    Eosinophils Absolute 0.1 0.0 - 0.4 thou/uL    Basophils Absolute 0.0 0.0 - 0.2 thou/uL       Assessment:     1. OAB (overactive bladder)    2. Essential hypertension    3. Diarrhea, unspecified type    4. Pain of toe of left foot    5. Mixed hyperlipidemia    6. Nonsmoker    7. Encounter to establish care with new doctor    8. Keratoacanthoma        Quality review:     PHQ-2 Total Score: 0 (3/24/2020  8:00 AM)    No data recorded  ______________________________________________________________________     BMI Readings from Last 1 Encounters:   03/19/20 22.63 kg/m        Plan:     1. Refilled medications.  2. Reviewed old records and these will be scanned into the chart.  3. May need to order additional records in the future as needed.  4. May communicate with the patient via email related to her lab work.  5. Follow-up sooner if the foot bothers her more.  Bunion is likely the etiology for this pain.  6. Monitor keratoacanthoma and if not improving to resolution, see dermatology.  7. Continue to work on preventative health care next visit.       Rufino Fenton MD  General Internal Medicine  [facility]  Clinic    Personal office fax - [phone]   Voice mail - [phone]  E-mail - [email or web address]      Return in about 1 year (around 3/19/2021), or if symptoms worsen or fail to improve, for annual wellness visit.     No future appointments.      ______________________________________________________________________     Relevant ICD-10 codes and order associations:      ICD-10-CM    1. OAB (overactive bladder)  N32.81 solifenacin (VESICARE) 5 MG tablet   2. Essential hypertension  I10 chlorthalidone (HYGROTEN) 25 MG tablet     Comprehensive Metabolic Panel     HM1(CBC and Differential)     HM1 (CBC with Diff)     amLODIPine-benazepril (LOTREL 5-20) 5-20 mg per capsule   3. Diarrhea, unspecified type  R19.7 Tissue Transglutaminase,IgA & IgG     HM1(CBC and Differential)     HM1 (CBC with Diff)   4. Pain of toe of left foot  M79.675 Uric Acid     Sedimentation Rate     C-Reactive Protein(CRP)     HM1(CBC and Differential)     HM1 (CBC with Diff)   5. Mixed hyperlipidemia  E78.2 Lipid Cascade RANDOM     rosuvastatin (CRESTOR) 5 MG tablet   6. Nonsmoker  Z78.9    7. Encounter to establish care with new doctor  Z76.89    8. Keratoacanthoma  L85.8
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
## 2021-07-19 ENCOUNTER — TELEPHONE (OUTPATIENT)
Dept: INTERNAL MEDICINE | Facility: CLINIC | Age: 86
End: 2021-07-19

## 2021-07-19 DIAGNOSIS — N32.81 OAB (OVERACTIVE BLADDER): Primary | ICD-10-CM

## 2021-07-19 NOTE — TELEPHONE ENCOUNTER
Please call patient -    ______________________________________________________________________     Home phone:  [phone] (home)     Cell phone:   Telephone Information:   Mobile [phone]       Other contacts:  Name Home Phone Work Phone Mobile Phone Relationship Lgl MUMTAZ Duke   [phone] Other      ______________________________________________________________________     I received her message about the urinary issues with leakage.    If there is any concern for infection, she could always do a urine test at the [facility] if she wishes.    We could increase the solifenacin as a next step to see if this helps.  It can be increased to 10 mg.    She can double up on her fives to try this if she wishes or I can send in a prescription for the 10 mg.  Please let me know.    Rufino Fenton MD  [facility]  7/19/2021, 4:33 PM

## 2021-07-20 RX ORDER — SOLIFENACIN SUCCINATE 10 MG/1
10 TABLET, FILM COATED ORAL DAILY
Qty: 90 TABLET | Refills: 3 | Status: SHIPPED | OUTPATIENT
Start: 2021-07-20 | End: 2022-08-03

## 2021-07-20 NOTE — TELEPHONE ENCOUNTER
Pt returned call.  Message Relayed.    Pt is requesting new RX for the higher does 10 mg    [facility] [place]

## 2021-08-20 ENCOUNTER — TELEPHONE (OUTPATIENT)
Dept: INTERNAL MEDICINE | Facility: CLINIC | Age: 86
End: 2021-08-20

## 2021-08-20 NOTE — TELEPHONE ENCOUNTER
Left message for pt to call back.  Pt left message about wanting to see Dr. Fenton for rib pain.  Dr. Fenton can see pt 8/23/21 at 3:00.  Please see if appointment will work, if not please cancel appointment and assist with rescheduling (scheduled pt in advance due to it being outside Dr. Fenton's normal hours).

## 2021-08-23 ENCOUNTER — OFFICE VISIT (OUTPATIENT)
Dept: INTERNAL MEDICINE | Facility: CLINIC | Age: 86
End: 2021-08-23
Payer: OTHER GOVERNMENT

## 2021-08-23 VITALS
OXYGEN SATURATION: 98 % | BODY MASS INDEX: 22.13 KG/M2 | HEART RATE: 70 BPM | WEIGHT: 121 LBS | DIASTOLIC BLOOD PRESSURE: 82 MMHG | SYSTOLIC BLOOD PRESSURE: 146 MMHG

## 2021-08-23 DIAGNOSIS — S22.060A CLOSED WEDGE COMPRESSION FRACTURE OF T7 VERTEBRA, INITIAL ENCOUNTER (H): ICD-10-CM

## 2021-08-23 DIAGNOSIS — I10 ESSENTIAL HYPERTENSION: Chronic | ICD-10-CM

## 2021-08-23 DIAGNOSIS — N32.81 OAB (OVERACTIVE BLADDER): ICD-10-CM

## 2021-08-23 DIAGNOSIS — R07.89 LEFT-SIDED CHEST WALL PAIN: Primary | ICD-10-CM

## 2021-08-23 PROCEDURE — 99214 OFFICE O/P EST MOD 30 MIN: CPT | Performed by: INTERNAL MEDICINE
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
## 2021-08-24 NOTE — PATIENT INSTRUCTIONS
Please follow up if you have any further issues.    You may contact me by phone or MyChart if you are worsening or if things are not improving.    ______________________________________________________________________     Please remember that you can call [phone] to schedule an appointment.     You can schedule appointments 24 hours a day, 7 days a week.  Sometimes the best time to schedule an appointment is after clinic hours when less people are calling in.  Weekends are another option for calling in to schedule appointments.

## 2021-09-13 ENCOUNTER — TELEPHONE (OUTPATIENT)
Dept: INTERNAL MEDICINE | Facility: CLINIC | Age: 86
End: 2021-09-13

## 2021-09-13 NOTE — TELEPHONE ENCOUNTER
Patient calling as follow up to CT she had done on 5/4/21.  Patient states that she's been receiving bills from the facility where it was done and that her insurance has not paid anything out on the CT.  She believes there may be some confusion with her insurance company regarding the medical necessity of this scan and had asked Dr Fenton about 2 weeks ago to write a letter to her insurance company to explain the medical necessity for the scan so that they will cover it.  Patient has not heard back about her request for Dr Fenton to write this letter and she is still getting bills regarding the CT.  Please call patient back with status update on this.    Patient can be reached at [phone].

## 2021-09-14 NOTE — TELEPHONE ENCOUNTER
Please notify the patient that I will try to attend to this this week.  I have a pile of paperwork I am still working on and this includes this request.    I will be mailing her the letter for her to forward onto her insurance.    Please return back to me when you review with her.    Rufino Fenton MD  General Internal Medicine  [facility]  9/13/2021, 9:44 PM

## 2021-09-14 NOTE — TELEPHONE ENCOUNTER
Called and lvm for patient to call back for message from provider.    Please relay and assist as needed.    Route encounter back to Dr Rufino Fenton per his request once this is reviewed with patient

## 2021-09-28 ENCOUNTER — TELEPHONE (OUTPATIENT)
Dept: INTERNAL MEDICINE | Facility: CLINIC | Age: 86
End: 2021-09-28

## 2021-12-14 DIAGNOSIS — E78.2 MIXED HYPERLIPIDEMIA: ICD-10-CM

## 2021-12-14 RX ORDER — ROSUVASTATIN CALCIUM 5 MG/1
5 TABLET, COATED ORAL
Qty: 36 TABLET | Refills: 3 | Status: SHIPPED | OUTPATIENT
Start: 2021-12-15 | End: 2021-12-17

## 2021-12-17 DIAGNOSIS — E78.2 MIXED HYPERLIPIDEMIA: ICD-10-CM

## 2021-12-17 RX ORDER — ROSUVASTATIN CALCIUM 5 MG/1
5 TABLET, COATED ORAL
Qty: 38 TABLET | Refills: 3 | Status: SHIPPED | OUTPATIENT
Start: 2021-12-17 | End: 2022-01-06

## 2021-12-17 NOTE — TELEPHONE ENCOUNTER
Pharmacy requesting Rosuvastatin quantity be changed from 36 to 38 so RX is for a 90 day supply.

## 2022-01-06 DIAGNOSIS — E78.2 MIXED HYPERLIPIDEMIA: ICD-10-CM

## 2022-01-06 RX ORDER — ROSUVASTATIN CALCIUM 5 MG/1
TABLET, COATED ORAL
Qty: 38 TABLET | Refills: 3 | Status: SHIPPED | OUTPATIENT
Start: 2022-01-06 | End: 2022-08-11

## 2022-01-28 ENCOUNTER — TELEPHONE (OUTPATIENT)
Dept: INTERNAL MEDICINE | Facility: CLINIC | Age: 87
End: 2022-01-28
Payer: OTHER GOVERNMENT

## 2022-01-31 ENCOUNTER — OFFICE VISIT (OUTPATIENT)
Dept: INTERNAL MEDICINE | Facility: CLINIC | Age: 87
End: 2022-01-31
Payer: OTHER GOVERNMENT

## 2022-01-31 VITALS
OXYGEN SATURATION: 97 % | WEIGHT: 126 LBS | HEART RATE: 74 BPM | DIASTOLIC BLOOD PRESSURE: 84 MMHG | BODY MASS INDEX: 23.05 KG/M2 | SYSTOLIC BLOOD PRESSURE: 148 MMHG

## 2022-01-31 DIAGNOSIS — M25.532 LEFT WRIST PAIN: ICD-10-CM

## 2022-01-31 DIAGNOSIS — G56.02 CARPAL TUNNEL SYNDROME OF LEFT WRIST: ICD-10-CM
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
## 2022-01-31 PROCEDURE — 99213 OFFICE O/P EST LOW 20 MIN: CPT | Performed by: INTERNAL MEDICINE

## 2022-02-03 NOTE — PATIENT INSTRUCTIONS
Please follow up if you have any further issues.    You may contact me by phone or MyChart if you are worsening or if things are not improving.    ______________________________________________________________________     Please remember that you can call [phone] to schedule an appointment.     You can schedule appointments 24 hours a day, 7 days a week.  Sometimes the best time to schedule an appointment is after clinic hours when less people are calling in.  Weekends are another option for calling in to schedule appointments.

## 2022-02-03 NOTE — PROGRESS NOTES
[facility] - Primary Care Specialists    Comprehensive and complex medical care - Chronic disease management - Shared decision making - Care coordination - Compassionate care    Patient advocacy - Rational deprescribing - Minimally disruptive medicine - Ethical focus - Customized care         Date of Service: 1/31/2022  Primary Provider: Rufino Fenton    Patient Care Team:  Rufino Fenton MD as PCP - General  Rufino Fenton MD as Assigned PCP          Patient's Pharmacy:    [id number] - [place], [place] - [address] AT [address]  [address]  [facility] [address]  Phone: [phone] Fax: [phone]     Patient's Contacts:  Name Home Phone Work Phone Mobile Phone Relationship Lgl MUMTAZ Duke   [phone] Other      Patient's Insurance:    Payor: [facility] / Plan: [facility] GEHA / Product Type: PPO /           Current Outpatient Medications   Medication Instructions     amLODIPine-benazepril (LOTREL 5-20) 5-20 mg per capsule 1 capsule, Oral, DAILY     aspirin (ASA) 81 mg, DAILY     calcium carb/vit D3/minerals (CALCIUM-VITAMIN D ORAL) 1 tablet, 2 TIMES DAILY     chlorthalidone (HYGROTON) 12.5 mg, Oral, DAILY     loperamide (IMODIUM) 2 mg, DAILY     multivit-mins no.63/iron/folic (M-VIT ORAL) 1 tablet, DAILY     rosuvastatin (CRESTOR) 5 MG tablet TAKE 1 TABLET(5 MG) BY MOUTH 3 TIMES A WEEK     solifenacin (VESICARE) 10 mg, Oral, DAILY     vit C/E/zinc ox/harrison/lut/zeax (ICAPS AREDS2 ORAL) 1 tablet, 2 TIMES DAILY        Subjective:      Sandra Modi is a 89 year old female who comes in today for:    Chief Complaint   Patient presents with     Fall     3 weeks ago having left wrist pain left thumb is numb and then 3 fingers will go numb will change between the fingers      Mainly in for follow up of left wrist pain and numbness in the left hand.    Slipped on ice about 3 weeks ago.  Unclear mechanism of injury.  Hurt the wrist and  had swelling associated with this.  Had x-ray at [facility] which apparently did not show anything serious.  Swelling and pain in the wrist has improved, but now especially in the last 1 week having extreme issues with numbness in the thumb and index and middle fingers.  Worse at night.  Can radiate up the arm as well.  Bothering her quite a bit.  Had issues with numbness in the thumb remotely as well.    Objective:     Wt Readings from Last 3 Encounters:   01/31/22 57.2 kg (126 lb)   08/23/21 54.9 kg (121 lb)   03/04/21 57.6 kg (127 lb)     BP Readings from Last 3 Encounters:   01/31/22 (!) 148/84   08/23/21 (!) 146/82   03/08/21 (!) 142/76      BP (!) 148/84   Pulse 74   Wt 57.2 kg (126 lb)   SpO2 97%   BMI 23.05 kg/m     In general, the patient is comfortable, no apparent distress.  Mood good.  Insight good.  Mild swelling of the wrist.  No focal pain.  Good range of motion.  No muscle wasting of the thumb.    Diagnostics:     No results found for any visits on 01/31/22.     Assessment:     1. Fall, initial encounter    2. Left wrist pain    3. Carpal tunnel syndrome of left wrist        Plan:     1. Request records from [facility].  2. Referral to hand and wrist specialist.  Carpal tunnel syndrome may be in part due to soft tissue injury with fall.  3. Follow up sooner if issues.    Rufino Fenton MD  General Internal Medicine  [facility]    Return in about 6 months (around 7/31/2022), or if symptoms worsen or fail to improve, for follow up visit.     No future appointments.

## 2022-02-04 ENCOUNTER — TRANSFERRED RECORDS (OUTPATIENT)
Dept: HEALTH INFORMATION MANAGEMENT | Facility: CLINIC | Age: 87
End: 2022-02-04
Payer: OTHER GOVERNMENT

## 2022-03-06 DIAGNOSIS — I10 ESSENTIAL HYPERTENSION: ICD-10-CM

## 2022-03-08 RX ORDER — AMLODIPINE AND BENAZEPRIL HYDROCHLORIDE 5; 20 MG/1; MG/1
CAPSULE ORAL
Qty: 90 CAPSULE | Refills: 3 | Status: SHIPPED | OUTPATIENT
Start: 2022-03-08 | End: 2023-03-01

## 2022-03-08 RX ORDER — CHLORTHALIDONE 25 MG/1
TABLET ORAL
Qty: 45 TABLET | Refills: 3 | Status: SHIPPED | OUTPATIENT
Start: 2022-03-08 | End: 2023-03-31

## 2022-03-08 NOTE — TELEPHONE ENCOUNTER
"Routing refill request to provider for review/approval because:  Labs not current:  CR  Blood pressure    Last Written Prescription Date:  2/15/2021  Last Fill Quantity: 90,  # refills: 3   Last office visit provider:  1/31/2022     Requested Prescriptions   Pending Prescriptions Disp Refills     amLODIPine-benazepril (LOTREL) 5-20 MG capsule [Pharmacy Med Name: AMLODIPINE-BENAZ 5/20MG CAPSULES] 90 capsule 3     Sig: TAKE 1 CAPSULE BY MOUTH DAILY       Calcium Channel Blockers Protocol  Failed - 3/6/2022  6:03 AM        Failed - Blood pressure under 140/90 in past 12 months     BP Readings from Last 3 Encounters:   01/31/22 (!) 148/84   08/23/21 (!) 146/82   03/08/21 (!) 142/76                 Failed - Normal serum creatinine on file in past 12 months     Recent Labs   Lab Test 03/04/21  1553   CR 0.71       Ok to refill medication if creatinine is low          Passed - Recent (12 mo) or future (30 days) visit within the authorizing provider's specialty     Patient has had an office visit with the authorizing provider or a provider within the authorizing providers department within the previous 12 mos or has a future within next 30 days. See \"Patient Info\" tab in inbasket, or \"Choose Columns\" in Meds & Orders section of the refill encounter.              Passed - Medication is active on med list        Passed - Patient is age 18 or older        Passed - No active pregnancy on record        Passed - No positive pregnancy test in past 12 months       ACE Inhibitors (Including Combos) Protocol Failed - 3/6/2022  6:03 AM        Failed - Blood pressure under 140/90 in past 12 months     BP Readings from Last 3 Encounters:   01/31/22 (!) 148/84   08/23/21 (!) 146/82   03/08/21 (!) 142/76                 Failed - Normal serum creatinine on file in past 12 months     Recent Labs   Lab Test 03/04/21  1553   CR 0.71       Ok to refill medication if creatinine is low          Failed - Normal serum potassium on file in past 12 " "months     Recent Labs   Lab Test 03/04/21  1553   POTASSIUM 4.2             Passed - Recent (12 mo) or future (30 days) visit within the authorizing provider's specialty     Patient has had an office visit with the authorizing provider or a provider within the authorizing providers department within the previous 12 mos or has a future within next 30 days. See \"Patient Info\" tab in inbasket, or \"Choose Columns\" in Meds & Orders section of the refill encounter.              Passed - Medication is active on med list        Passed - Patient is age 18 or older        Passed - No active pregnancy on record        Passed - No positive pregnancy test within past 12 months        Routing refill request to provider for review/approval because:  Labs not current:  CR, Potassium, NA  Blood pressure     Last Written Prescription Date:  2/15/2021  Last Fill Quantity: 45,  # refills: 3        chlorthalidone (HYGROTON) 25 MG tablet [Pharmacy Med Name: CHLORTHALIDONE 25MG TABLETS] 45 tablet 3     Sig: TAKE 1/2 TABLET(12.5 MG) BY MOUTH DAILY       Diuretics (Including Combos) Protocol Failed - 3/6/2022  6:03 AM        Failed - Blood pressure under 140/90 in past 12 months     BP Readings from Last 3 Encounters:   01/31/22 (!) 148/84   08/23/21 (!) 146/82   03/08/21 (!) 142/76                 Failed - Normal serum creatinine on file in past 12 months     Recent Labs   Lab Test 03/04/21  1553   CR 0.71              Failed - Normal serum potassium on file in past 12 months     Recent Labs   Lab Test 03/04/21  1553   POTASSIUM 4.2                    Failed - Normal serum sodium on file in past 12 months     Recent Labs   Lab Test 03/04/21  1553                 Passed - Recent (12 mo) or future (30 days) visit within the authorizing provider's specialty     Patient has had an office visit with the authorizing provider or a provider within the authorizing providers department within the previous 12 mos or has a future within next 30 " "days. See \"Patient Info\" tab in inbasket, or \"Choose Columns\" in Meds & Orders section of the refill encounter.              Passed - Medication is active on med list        Passed - Patient is age 18 or older        Passed - No active pregancy on record        Passed - No positive pregnancy test in past 12 months             Greer Clark RN 03/07/22 10:30 PM  "

## 2022-03-25 ENCOUNTER — OFFICE VISIT (OUTPATIENT)
Dept: INTERNAL MEDICINE | Facility: CLINIC | Age: 87
End: 2022-03-25
Payer: OTHER GOVERNMENT

## 2022-03-25 VITALS
HEIGHT: 63 IN | TEMPERATURE: 97.9 F | BODY MASS INDEX: 21.97 KG/M2 | OXYGEN SATURATION: 98 % | WEIGHT: 124 LBS | HEART RATE: 60 BPM | DIASTOLIC BLOOD PRESSURE: 82 MMHG | SYSTOLIC BLOOD PRESSURE: 136 MMHG

## 2022-03-25 DIAGNOSIS — S22.060A CLOSED WEDGE COMPRESSION FRACTURE OF T7 VERTEBRA, INITIAL ENCOUNTER (H): ICD-10-CM

## 2022-03-25 DIAGNOSIS — Z01.818 PREOPERATIVE EXAMINATION: Primary | ICD-10-CM

## 2022-03-25 DIAGNOSIS — M25.471 RIGHT ANKLE SWELLING: ICD-10-CM

## 2022-03-25 DIAGNOSIS — G56.02 CARPAL TUNNEL SYNDROME OF LEFT WRIST: ICD-10-CM

## 2022-03-25 DIAGNOSIS — I10 PRIMARY HYPERTENSION: ICD-10-CM

## 2022-03-25 DIAGNOSIS — N32.81 OAB (OVERACTIVE BLADDER): ICD-10-CM

## 2022-03-25 DIAGNOSIS — M25.531 RIGHT WRIST PAIN: ICD-10-CM

## 2022-03-25 DIAGNOSIS — E78.2 MIXED HYPERLIPIDEMIA: ICD-10-CM

## 2022-03-25 LAB
ANION GAP SERPL CALCULATED.3IONS-SCNC: 12 MMOL/L (ref 5–18)
ATRIAL RATE - MUSE: 75 BPM
BUN SERPL-MCNC: 12 MG/DL (ref 8–28)
C REACTIVE PROTEIN LHE: 2.3 MG/DL (ref 0–0.8)
CALCIUM SERPL-MCNC: 9.4 MG/DL (ref 8.5–10.5)
CHLORIDE BLD-SCNC: 95 MMOL/L (ref 98–107)
CHOLEST SERPL-MCNC: 154 MG/DL
CO2 SERPL-SCNC: 24 MMOL/L (ref 22–31)
CREAT SERPL-MCNC: 0.69 MG/DL (ref 0.6–1.1)
DIASTOLIC BLOOD PRESSURE - MUSE: NORMAL MMHG
ERYTHROCYTE [DISTWIDTH] IN BLOOD BY AUTOMATED COUNT: 12.1 % (ref 10–15)
ERYTHROCYTE [SEDIMENTATION RATE] IN BLOOD BY WESTERGREN METHOD: 40 MM/HR (ref 0–20)
FASTING STATUS PATIENT QL REPORTED: YES
GFR SERPL CREATININE-BSD FRML MDRD: 82 ML/MIN/1.73M2
GLUCOSE BLD-MCNC: 101 MG/DL (ref 70–125)
HCT VFR BLD AUTO: 40.8 % (ref 35–47)
HDLC SERPL-MCNC: 71 MG/DL
HGB BLD-MCNC: 14.1 G/DL (ref 11.7–15.7)
INTERPRETATION ECG - MUSE: NORMAL
LDLC SERPL CALC-MCNC: 69 MG/DL
MCH RBC QN AUTO: 33 PG (ref 26.5–33)
MCHC RBC AUTO-ENTMCNC: 34.6 G/DL (ref 31.5–36.5)
MCV RBC AUTO: 96 FL (ref 78–100)
P AXIS - MUSE: 74 DEGREES
PLATELET # BLD AUTO: 294 10E3/UL (ref 150–450)
POTASSIUM BLD-SCNC: 4.4 MMOL/L (ref 3.5–5)
PR INTERVAL - MUSE: 178 MS
QRS DURATION - MUSE: 72 MS
QT - MUSE: 394 MS
QTC - MUSE: 439 MS
R AXIS - MUSE: -37 DEGREES
RBC # BLD AUTO: 4.27 10E6/UL (ref 3.8–5.2)
RHEUMATOID FACT SER NEPH-ACNC: <15 IU/ML (ref 0–30)
SODIUM SERPL-SCNC: 131 MMOL/L (ref 136–145)
SYSTOLIC BLOOD PRESSURE - MUSE: NORMAL MMHG
T AXIS - MUSE: 38 DEGREES
TRIGL SERPL-MCNC: 69 MG/DL
VENTRICULAR RATE- MUSE: 75 BPM
WBC # BLD AUTO: 6.6 10E3/UL (ref 4–11)

## 2022-03-25 PROCEDURE — 93010 ELECTROCARDIOGRAM REPORT: CPT | Performed by: INTERNAL MEDICINE

## 2022-03-25 PROCEDURE — 85652 RBC SED RATE AUTOMATED: CPT | Performed by: INTERNAL MEDICINE

## 2022-03-25 PROCEDURE — 80048 BASIC METABOLIC PNL TOTAL CA: CPT | Performed by: INTERNAL MEDICINE

## 2022-03-25 PROCEDURE — 36415 COLL VENOUS BLD VENIPUNCTURE: CPT | Performed by: INTERNAL MEDICINE

## 2022-03-25 PROCEDURE — 86431 RHEUMATOID FACTOR QUANT: CPT | Performed by: INTERNAL MEDICINE

## 2022-03-25 PROCEDURE — 99214 OFFICE O/P EST MOD 30 MIN: CPT | Performed by: INTERNAL MEDICINE

## 2022-03-25 PROCEDURE — 93005 ELECTROCARDIOGRAM TRACING: CPT | Performed by: INTERNAL MEDICINE

## 2022-03-25 PROCEDURE — 86140 C-REACTIVE PROTEIN: CPT | Performed by: INTERNAL MEDICINE

## 2022-03-25 PROCEDURE — 80061 LIPID PANEL: CPT | Performed by: INTERNAL MEDICINE

## 2022-03-25 PROCEDURE — 86200 CCP ANTIBODY: CPT | Performed by: INTERNAL MEDICINE

## 2022-03-25 PROCEDURE — 85027 COMPLETE CBC AUTOMATED: CPT | Performed by: INTERNAL MEDICINE

## 2022-03-25 ASSESSMENT — PATIENT HEALTH QUESTIONNAIRE - PHQ9
10. IF YOU CHECKED OFF ANY PROBLEMS, HOW DIFFICULT HAVE THESE PROBLEMS MADE IT FOR YOU TO DO YOUR WORK, TAKE CARE OF THINGS AT HOME, OR GET ALONG WITH OTHER PEOPLE: NOT DIFFICULT AT ALL
SUM OF ALL RESPONSES TO PHQ QUESTIONS 1-9: 0
SUM OF ALL RESPONSES TO PHQ QUESTIONS 1-9: 0

## 2022-03-25 NOTE — LETTER
3/29/2022    Sandra Modi   [address]  [facility] [address]         Dear Sandra,    It was good to see you in clinic.  I hope your questions were answered at the time of your visit.    The results of your tests from your visit were as follows:    Resulted Orders   Basic metabolic panel   Result Value Ref Range    Sodium 131 (L) 136 - 145 mmol/L    Potassium 4.4 3.5 - 5.0 mmol/L    Chloride 95 (L) 98 - 107 mmol/L    Carbon Dioxide (CO2) 24 22 - 31 mmol/L    Anion Gap 12 5 - 18 mmol/L    Urea Nitrogen 12 8 - 28 mg/dL    Creatinine 0.69 0.60 - 1.10 mg/dL    Calcium 9.4 8.5 - 10.5 mg/dL    Glucose 101 70 - 125 mg/dL    GFR Estimate 82 >60 mL/min/1.73m2   CBC with platelets   Result Value Ref Range    WBC Count 6.6 4.0 - 11.0 10e3/uL    RBC Count 4.27 3.80 - 5.20 10e6/uL    Hemoglobin 14.1 11.7 - 15.7 g/dL    Hematocrit 40.8 35.0 - 47.0 %    MCV 96 78 - 100 fL    MCH 33.0 26.5 - 33.0 pg    MCHC 34.6 31.5 - 36.5 g/dL    RDW 12.1 10.0 - 15.0 %    Platelet Count 294 150 - 450 10e3/uL   Erythrocyte sedimentation rate auto   Result Value Ref Range    Erythrocyte Sedimentation Rate 40 (H) 0 - 20 mm/hr   CRP inflammation   Result Value Ref Range    CRP 2.3 (H) 0.0-<0.8 mg/dL   Lipid panel reflex to direct LDL Non-fasting   Result Value Ref Range    Cholesterol 154 <=199 mg/dL    Triglycerides 69 <=149 mg/dL    Direct Measure HDL 71 >=50 mg/dL    LDL Cholesterol Calculated 69 <=129 mg/dL    Patient Fasting > 8hrs? Yes    Rheumatoid factor   Result Value Ref Range    Rheumatoid Factor <15 0 - 30 IU/mL   Cyclic Citrullinated Peptide Antibody IgG   Result Value Ref Range    Cyclic Citrullinated Peptide Antibody IgG 1.0 <=4.9 U/mL     Your cholesterol is doing well.  Your blood counts are normal and you are not anemic.     Your sodium is slightly low likely related to your chlorthalidone use.  This is not of concern.  Your kidney tests were normal.     Your markers of inflammation are elevated and this could be related  to the swelling in your right wrist and right ankle.  There are no signs of rheumatoid arthritis.     There inflammation could be related to a process/disease related to calcium pyrophosphate crystals in the joint.  Over the counter (OTC) naproxen (Aleve) and ibuprofen (Advil) can help with this if needed.    Please let me know how your inflammation is doing if it is not improving.    Best wishes on your surgery.     If you have any questions regarding these results, please feel free to contact me at [phone].  I wish you the best of health!      Sincerely,       Rufino Fenton MD  General Internal Medicine  [facility]

## 2022-03-25 NOTE — PROGRESS NOTES
[facility]  Primary Care Specialists    Care coordination - Customized care -  Comprehensive and complex medical care            Date of Service: 3/25/2022  Primary Provider: Rufino Fenton    Patient Care Team:  Rufino Fenton MD as PCP - General  Rufino Fenton MD as Assigned PCP  Shree Kruger MD as MD (Ophthalmology)  Federico Raza MD as MD (Orthopaedic Surgery)            Patient's Pharmacy:    [id number] - [place], [place] - [address]  [address]  [facility] [address]  Phone: [phone] Fax: [phone]     Patient's Contacts:  Name Home Phone Work Phone Mobile Phone Relationship Lgl MUMTAZ [facility]   [phone] Other        Patient's Insurance:    Payor: [facility] / Plan: [facility]HA / Product Type: PPO /           Preoperative examination    Sandra Modi is a 89 year old female (8/11/1931) who I am asked to see by her surgeon regarding her fitness for upcoming surgery.      Chief Complaint   Patient presents with     Pre-Op Exam     3/29/22, tendon on left hand, [facility] unsure where, Dr. [facility]        Subjective:     Patient comes in today for preoperative evaluation prior to her planned left hand surgery for carpal tunnel at [facility].    Last note we have from [facility] is from February.  We do not have the EMG results to review nor do we know the whole surgery planned.  She says that she will be put under for this.    She has had numbness in the thumb.  This preceded her recent wrist injury but got worse afterwards.    We reviewed her right wrist pain.  This has been present over a week.  She thinks she might have bumped it as there was a bruise over the medial wrist.  She has had swelling and warmth over this area.  She is used ibuprofen which has helped somewhat.  She did not fall on this wrist.    She has noticed a little bit of swelling in her right ankle in  the last day.  It is not painful.  She has been wrapping it with good success.    We reviewed her high blood pressure.  She does have a whitecoat component to this as well.  She has had no cardiac symptoms.    Her hyperlipidemia will be checked today.    In relationship to her compression fracture she still has some issues related to this.    Her overactive bladder is stable at this point.    ______________________________________________________________________     Pre-op Questionnaire 3/25/2022   1. Have you ever had a heart attack or stroke? No   2. Have you ever had surgery on your heart or blood vessels, such as a stent placement, a coronary artery bypass, or surgery on an artery in your head, neck, heart, or legs? No   3. Do you have chest pain with activity? No   4. Do you have a history of  heart failure? No   5. Do you currently have a cold, bronchitis or symptoms of other infection? No   6. Do you have a cough, shortness of breath, or wheezing? No   7. Do you or anyone in your family have previous history of blood clots? No   8. Do you or does anyone in your family have a serious bleeding problem such as prolonged bleeding following surgeries or cuts? No   9. Have you ever had problems with anemia or been told to take iron pills? No   10. Have you had any abnormal blood loss such as black, tarry or bloody stools, or abnormal vaginal bleeding? No   11. Have you ever had a blood transfusion? No   12. Are you willing to have a blood transfusion if it is medically needed before, during, or after your surgery? Yes   13. Have you or any of your relatives ever had problems with anesthesia? No   14. Do you have sleep apnea, excessive snoring or daytime drowsiness? Can fall asleep easily if sitting.   15. Do you have any artifical heart valves or other implanted medical devices like a pacemaker, defibrillator, or continuous glucose monitor? No   16. Do you have artificial joints? No   17. Are you allergic to latex?  No     ______________________________________________________________________     We reviewed her other issues noted in the assessment but not specifically addressed in the HPI above.   ______________________________________________________________________     Patient Active Problem List   Diagnosis     Nonsmoker     HTN (hypertension)     HLD (hyperlipidemia)     OAB (overactive bladder)     Closed wedge compression fracture of T7 vertebra, initial encounter (H)       Past Surgical History:   Procedure Laterality Date     CATARACT EXTRACTION, BILATERAL  2020     TONSILLECTOMY  1937     WRIST FRACTURE SURGERY Right 2001      Social History     Occupational History     Not on file   Tobacco Use     Smoking status: Never Smoker     Smokeless tobacco: Never Used   Substance and Sexual Activity     Alcohol use: Not Currently     Drug use: Not on file     Sexual activity: Not on file     Social History     Social History Narrative    .  Lives in the [facility] near [place].    Son is Dr. Vikas Modi, orthopedist at [facility].    Has 2 daughters who live in the [facility] area and 1 who lives in [place].    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  Previously lived in [place] ([id number]).    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      Family History   Problem Relation Age of Onset     Transient ischemic attack Mother      Hypertension Mother      Alzheimer Disease Father      Colon Cancer Father      Kidney Cancer Sister      No Known Problems Son      No Known Problems Daughter      No Known Problems Daughter       Family history is noncontributory otherwise.    Allergies: Sulfa (sulfonamide antibiotics) [sulfa drugs]     Current medications:  Current Outpatient Medications   Medication Instructions     amLODIPine-benazepril (LOTREL) 5-20 MG capsule TAKE 1 CAPSULE BY MOUTH DAILY     aspirin (ASA) 81 mg, DAILY     calcium carb/vit D3/minerals (CALCIUM-VITAMIN D  "ORAL) 1 tablet, 2 TIMES DAILY     chlorthalidone (HYGROTON) 25 MG tablet TAKE 1/2 TABLET(12.5 MG) BY MOUTH DAILY     loperamide (IMODIUM) 2 mg, Oral, THREE TIMES WEEKLY     multivit-mins no.63/iron/folic (M-VIT ORAL) 1 tablet, DAILY     rosuvastatin (CRESTOR) 5 MG tablet TAKE 1 TABLET(5 MG) BY MOUTH 3 TIMES A WEEK     solifenacin (VESICARE) 10 mg, Oral, DAILY     vit C/E/zinc ox/harrison/lut/zeax (ICAPS AREDS2 ORAL) 1 tablet, 2 TIMES DAILY        Objective:     Wt Readings from Last 3 Encounters:   03/25/22 56.2 kg (124 lb)   01/31/22 57.2 kg (126 lb)   08/23/21 54.9 kg (121 lb)     BP Readings from Last 3 Encounters:   03/25/22 136/82   01/31/22 (!) 148/84   08/23/21 (!) 146/82     /82   Pulse 60   Temp 97.9  F (36.6  C) (Oral)   Ht 1.588 m (5' 2.5\")   Wt 56.2 kg (124 lb)   SpO2 98%   BMI 22.32 kg/m     Patient is in no acute distress.  Mood good.  Insight fair to good.  Eyes nonicteric.  Pupils equal.  Ears clear.  Nose clear.  Throat clear.  Neck is supple.  No cervical adenopathy.  No thyromegaly.  Heart is regular rate and rhythm.  Lungs clear to auscultation bilaterally.  Respiratory effort is good.  Abdomen is soft, nontender, no hepatosplenomegaly.  Extremities no edema.  There is some warmth and synovitis to the right wrist noted with some soft tissue swelling as well.  There is some soft tissue swelling of the right ankle.  It is wrapped.    Diagnostics:     Office Visit - [facility] on 03/04/2021   Component Date Value Ref Range Status     Sodium 03/04/2021 136  136 - 145 mmol/L Final     Potassium 03/04/2021 4.2  3.5 - 5.0 mmol/L Final     Chloride 03/04/2021 97 (A) 98 - 107 mmol/L Final     Carbon Dioxide (CO2) 03/04/2021 27  22 - 31 mmol/L Final     Anion Gap 03/04/2021 12  5 - 18 mmol/L Final     Glucose 03/04/2021 94  70 - 125 mg/dL Final     Urea Nitrogen 03/04/2021 18  8 - 28 mg/dL Final     Creatinine 03/04/2021 0.71  0.60 - 1.10 mg/dL Final     GFR Estimate If Black 03/04/2021 >60  >60 " mL/min/1.73m2 Final     GFR Estimate 03/04/2021 >60  >60 mL/min/1.73m2 Final     Bilirubin Total 03/04/2021 0.5  0.0 - 1.0 mg/dL Final     Calcium 03/04/2021 9.1  8.5 - 10.5 mg/dL Final     Protein Total 03/04/2021 6.9  6.0 - 8.0 g/dL Final     Albumin 03/04/2021 4.0  3.5 - 5.0 g/dL Final     Alkaline Phosphatase 03/04/2021 131 (A) 45 - 120 U/L Final     AST 03/04/2021 32  0 - 40 U/L Final     ALT 03/04/2021 38  0 - 45 U/L Final     WBC 03/04/2021 7.1  4.0 - 11.0 thou/uL Final     RBC Count 03/04/2021 4.28  3.80 - 5.40 mill/uL Final     Hemoglobin 03/04/2021 14.2  12.0 - 16.0 g/dL Final     Hematocrit 03/04/2021 41.6  35.0 - 47.0 % Final     MCV 03/04/2021 97  80 - 100 fL Final     MCH 03/04/2021 33.2  27.0 - 34.0 pg Final     MCHC 03/04/2021 34.1  32.0 - 36.0 g/dL Final     RDW 03/04/2021 12.3  11.0 - 14.5 % Final     Platelet Count 03/04/2021 270  140 - 440 thou/uL Final     Mean Platelet Volume 03/04/2021 8.7  7.0 - 10.0 fL Final     Cholesterol 03/04/2021 175  <=199 mg/dL Final     Triglycerides 03/04/2021 80  <=149 mg/dL Final     Direct Measure HDL 03/04/2021 84  >=50 mg/dL Final     LDL Cholesterol Calculated 03/04/2021 75  <=129 mg/dL Final     Patient Fasting > 8hrs? 03/04/2021 No   Final        Results for orders placed or performed in visit on 03/25/22   CBC with platelets     Status: Normal   Result Value Ref Range    WBC Count 6.6 4.0 - 11.0 10e3/uL    RBC Count 4.27 3.80 - 5.20 10e6/uL    Hemoglobin 14.1 11.7 - 15.7 g/dL    Hematocrit 40.8 35.0 - 47.0 %    MCV 96 78 - 100 fL    MCH 33.0 26.5 - 33.0 pg    MCHC 34.6 31.5 - 36.5 g/dL    RDW 12.1 10.0 - 15.0 %    Platelet Count 294 150 - 450 10e3/uL   Erythrocyte sedimentation rate auto     Status: Abnormal   Result Value Ref Range    Erythrocyte Sedimentation Rate 40 (H) 0 - 20 mm/hr   EKG 12-lead, tracing only     Status: None   Result Value Ref Range    Systolic Blood Pressure  mmHg    Diastolic Blood Pressure  mmHg    Ventricular Rate 75 BPM    Atrial  Rate 75 BPM    KS Interval 178 ms    QRS Duration 72 ms     ms    QTc 439 ms    P Axis 74 degrees    R AXIS -37 degrees    T Axis 38 degrees    Interpretation ECG       Sinus rhythm with frequent Premature ventricular complexes  Possible Left atrial enlargement  Left axis deviation  Septal infarct , age undetermined  Abnormal ECG  No previous ECGs available  Confirmed by SHAWNA RAMOS MD LOC:JN ([id number]) on 3/25/2022 10:46:20 AM          Impression:     1. Preoperative examination    2. Carpal tunnel syndrome of left wrist    3. Mixed hyperlipidemia    4. Primary hypertension    5. Right wrist pain    6. Closed wedge compression fracture of T7 vertebra, initial encounter (H)    7. OAB (overactive bladder)    8. Right ankle swelling        Plan:     1. Okay to proceed with surgery as planned.  2. Blood work done today as well as EKG.  3. She can take her blood pressure medications on the morning of surgery with a sip of water.  4. She will stop aspirin prior to the surgery.  5. She will continue her medications otherwise.  6. A Chem-8 is pending at the time of this dictation and will be forwarded on when it returns.         Rufino Fenton MD  General Internal Medicine  [facility]    Return in about 1 year (around 3/25/2023), or if symptoms worsen or fail to improve, for annual wellness visit.     No future appointments.    Answers for HPI/ROS submitted by the patient on 3/25/2022  If you checked off any problems, how difficult have these problems made it for you to do your work, take care of things at home, or get along with other people?: Not difficult at all  PHQ9 TOTAL SCORE: 0

## 2022-03-25 NOTE — PATIENT INSTRUCTIONS

## 2022-03-26 ASSESSMENT — PATIENT HEALTH QUESTIONNAIRE - PHQ9: SUM OF ALL RESPONSES TO PHQ QUESTIONS 1-9: 0

## 2022-03-29 LAB — CCP AB SER IA-ACNC: 1 U/ML

## 2022-04-29 ENCOUNTER — TRANSFERRED RECORDS (OUTPATIENT)
Dept: HEALTH INFORMATION MANAGEMENT | Facility: CLINIC | Age: 87
End: 2022-04-29
Payer: OTHER GOVERNMENT

## 2022-06-08 ENCOUNTER — NURSE TRIAGE (OUTPATIENT)
Dept: NURSING | Facility: CLINIC | Age: 87
End: 2022-06-08
Payer: OTHER GOVERNMENT

## 2022-08-03 DIAGNOSIS — N32.81 OAB (OVERACTIVE BLADDER): ICD-10-CM

## 2022-08-03 RX ORDER — SOLIFENACIN SUCCINATE 10 MG/1
TABLET, FILM COATED ORAL
Qty: 90 TABLET | Refills: 3 | Status: SHIPPED | OUTPATIENT
Start: 2022-08-03 | End: 2023-03-14

## 2022-08-11 ENCOUNTER — TELEPHONE (OUTPATIENT)
Dept: INTERNAL MEDICINE | Facility: CLINIC | Age: 87
End: 2022-08-11

## 2022-08-11 DIAGNOSIS — E78.2 MIXED HYPERLIPIDEMIA: ICD-10-CM

## 2022-08-11 NOTE — TELEPHONE ENCOUNTER
Medication Question or Refill    Contacts       Type Contact Phone/Fax    08/11/2022 11:44 AM CDT Phone (Incoming) Sandra Modi (Self) [phone] (H)          What medication are you calling about (include dose and sig)?: rosuvastatin (CRESTOR) 5 mg tablet     Controlled Substance Agreement on file:   CSA -- Patient Level:    CSA: None found at the patient level.       Who prescribed the medication?: PCP    Do you need a refill? Yes: Per patient she is having an issue with this RX.  She is not getting enough for the month.  She only take on Monday Wednesday and Friday but she never gets enough for the whole month.    When did you use the medication last? Yesterday    Patient offered an appointment? Yes: Patient would like to hear from PCP if that is necessary to be seen because if this is just part of again may not be necessary.  Also would like to know if she should increase her activity to help reduce pain from getting up and down.     Do you have any questions or concerns?  Yes: All of a sudden she is starting to feel pain when she stands from a sitting position.  She is wondering if this is just part of the aging process.      Preferred Pharmacy:   [id number] - [place], [place] - [address] AT [facility] [address]  [address]  [facility] [address]  Phone: [phone] Fax: [phone]      Okay to leave a detailed message?: Yes at Home number on file [phone] (home)

## 2022-08-12 RX ORDER — ROSUVASTATIN CALCIUM 5 MG/1
5 TABLET, COATED ORAL
Qty: 48 TABLET | Refills: 3 | Status: SHIPPED | OUTPATIENT
Start: 2022-08-13 | End: 2023-06-14

## 2022-08-12 NOTE — TELEPHONE ENCOUNTER
Spoke to patient-she has only been able to  a 30 day supply for the Rosuvastatin so she is usually 1 short per month.  Called pharmacy- they state insurance only covers 30 day supply so they will run as 13 per month to see if this will help with the issue.  Patient plans to go to pharmacy today so they will speak to her in regards to this.       Discussed pain with patient.  She states occasionally has low back pain & Knee pain when going from sitting to standing.  This is resolved when she is up & moving around.    Patient just wanted PCP to know as FYI in case he thinks it is not age related.

## 2022-08-12 NOTE — TELEPHONE ENCOUNTER
Spoke to patient- relayed message    Pt will continue to monitor symptoms and let us know if they worsen

## 2022-08-12 NOTE — TELEPHONE ENCOUNTER
Please call patient.    I sent in a prescription for this with an alteration on the prescription for 4 times weekly.  This will last her longer then.  She can continue to take 3 times weekly as we previously have discussed, but it should resolve the insurance issue.    As far as the pain issues, she can come in if she would like to investigate this further, but if its not too bad then she can continue to follow.  If she needs a visit, it can be scheduled as an annual wellness visit if she wishes.  Otherwise, she can follow-up in March of next year for an annual wellness visit.    Rufino Fenton MD  General Internal Medicine  [facility]  8/12/2022, 9:22 AM

## 2023-03-01 DIAGNOSIS — I10 ESSENTIAL HYPERTENSION: ICD-10-CM

## 2023-03-01 RX ORDER — AMLODIPINE AND BENAZEPRIL HYDROCHLORIDE 5; 20 MG/1; MG/1
CAPSULE ORAL
Qty: 90 CAPSULE | Refills: 0 | Status: SHIPPED | OUTPATIENT
Start: 2023-03-01 | End: 2023-05-30

## 2023-03-02 NOTE — TELEPHONE ENCOUNTER
"Last Written Prescription Date:  3/8/22  Last Fill Quantity: 90,  # refills: 3   Last office visit provider:  3/25/22     Requested Prescriptions   Pending Prescriptions Disp Refills     amLODIPine-benazepril (LOTREL) 5-20 MG capsule [Pharmacy Med Name: AMLODIPINE-BENAZ 5/20MG CAPSULES] 90 capsule 3     Sig: TAKE 1 CAPSULE BY MOUTH DAILY       Calcium Channel Blockers Protocol  Passed - 3/1/2023  6:01 AM        Passed - Blood pressure under 140/90 in past 12 months     BP Readings from Last 3 Encounters:   03/25/22 136/82   01/31/22 (!) 148/84   08/23/21 (!) 146/82                 Passed - Recent (12 mo) or future (30 days) visit within the authorizing provider's specialty     Patient has had an office visit with the authorizing provider or a provider within the authorizing providers department within the previous 12 mos or has a future within next 30 days. See \"Patient Info\" tab in inbasket, or \"Choose Columns\" in Meds & Orders section of the refill encounter.              Passed - Medication is active on med list        Passed - Patient is age 18 or older        Passed - No active pregnancy on record        Passed - Normal serum creatinine on file in past 12 months     Recent Labs   Lab Test 03/25/22  1021   CR 0.69       Ok to refill medication if creatinine is low          Passed - No positive pregnancy test in past 12 months       ACE Inhibitors (Including Combos) Protocol Passed - 3/1/2023  6:01 AM        Passed - Blood pressure under 140/90 in past 12 months     BP Readings from Last 3 Encounters:   03/25/22 136/82   01/31/22 (!) 148/84   08/23/21 (!) 146/82                 Passed - Recent (12 mo) or future (30 days) visit within the authorizing provider's specialty     Patient has had an office visit with the authorizing provider or a provider within the authorizing providers department within the previous 12 mos or has a future within next 30 days. See \"Patient Info\" tab in inbasket, or \"Choose Columns\" in " Meds & Orders section of the refill encounter.              Passed - Medication is active on med list        Passed - Patient is age 18 or older        Passed - No active pregnancy on record        Passed - Normal serum creatinine on file in past 12 months     Recent Labs   Lab Test 03/25/22  1021   CR 0.69       Ok to refill medication if creatinine is low          Passed - Normal serum potassium on file in past 12 months     Recent Labs   Lab Test 03/25/22  1021   POTASSIUM 4.4             Passed - No positive pregnancy test within past 12 months             Selena Tolentino, RN 03/01/23 6:59 PM

## 2023-03-03 ENCOUNTER — OFFICE VISIT (OUTPATIENT)
Dept: INTERNAL MEDICINE | Facility: CLINIC | Age: 88
End: 2023-03-03
Payer: OTHER GOVERNMENT

## 2023-03-03 VITALS
SYSTOLIC BLOOD PRESSURE: 158 MMHG | HEIGHT: 62 IN | WEIGHT: 121.9 LBS | HEART RATE: 78 BPM | OXYGEN SATURATION: 97 % | DIASTOLIC BLOOD PRESSURE: 102 MMHG | BODY MASS INDEX: 22.43 KG/M2

## 2023-03-03 DIAGNOSIS — Z00.00 ENCOUNTER FOR MEDICARE ANNUAL WELLNESS EXAM: Primary | ICD-10-CM

## 2023-03-03 DIAGNOSIS — S22.060A CLOSED WEDGE COMPRESSION FRACTURE OF T7 VERTEBRA, INITIAL ENCOUNTER (H): ICD-10-CM

## 2023-03-03 DIAGNOSIS — I10 PRIMARY HYPERTENSION: Chronic | ICD-10-CM

## 2023-03-03 DIAGNOSIS — M79.10 MUSCLE PAIN: ICD-10-CM

## 2023-03-03 DIAGNOSIS — N32.81 OAB (OVERACTIVE BLADDER): ICD-10-CM

## 2023-03-03 DIAGNOSIS — I10 ESSENTIAL HYPERTENSION: ICD-10-CM

## 2023-03-03 DIAGNOSIS — E78.2 MIXED HYPERLIPIDEMIA: ICD-10-CM

## 2023-03-03 LAB
ALBUMIN SERPL BCG-MCNC: 4.3 G/DL (ref 3.5–5.2)
ALP SERPL-CCNC: 110 U/L (ref 35–104)
ALT SERPL W P-5'-P-CCNC: 23 U/L (ref 10–35)
ANION GAP SERPL CALCULATED.3IONS-SCNC: 12 MMOL/L (ref 7–15)
AST SERPL W P-5'-P-CCNC: 33 U/L (ref 10–35)
BASOPHILS # BLD AUTO: 0 10E3/UL (ref 0–0.2)
BASOPHILS NFR BLD AUTO: 1 %
BILIRUB SERPL-MCNC: 0.6 MG/DL
BUN SERPL-MCNC: 11.6 MG/DL (ref 8–23)
CALCIUM SERPL-MCNC: 9.6 MG/DL (ref 8.2–9.6)
CHLORIDE SERPL-SCNC: 95 MMOL/L (ref 98–107)
CHOLEST SERPL-MCNC: 174 MG/DL
CK SERPL-CCNC: 80 U/L (ref 26–192)
CREAT SERPL-MCNC: 0.6 MG/DL (ref 0.51–0.95)
CRP SERPL-MCNC: <3 MG/L
DEPRECATED HCO3 PLAS-SCNC: 27 MMOL/L (ref 22–29)
EOSINOPHIL # BLD AUTO: 0.1 10E3/UL (ref 0–0.7)
EOSINOPHIL NFR BLD AUTO: 1 %
ERYTHROCYTE [DISTWIDTH] IN BLOOD BY AUTOMATED COUNT: 12.4 % (ref 10–15)
ERYTHROCYTE [SEDIMENTATION RATE] IN BLOOD BY WESTERGREN METHOD: 31 MM/HR (ref 0–20)
GFR SERPL CREATININE-BSD FRML MDRD: 84 ML/MIN/1.73M2
GLUCOSE SERPL-MCNC: 101 MG/DL (ref 70–99)
HCT VFR BLD AUTO: 43.9 % (ref 35–47)
HDLC SERPL-MCNC: 88 MG/DL
HGB BLD-MCNC: 14.8 G/DL (ref 11.7–15.7)
IMM GRANULOCYTES # BLD: 0 10E3/UL
IMM GRANULOCYTES NFR BLD: 0 %
LDLC SERPL CALC-MCNC: 72 MG/DL
LYMPHOCYTES # BLD AUTO: 1.4 10E3/UL (ref 0.8–5.3)
LYMPHOCYTES NFR BLD AUTO: 22 %
MCH RBC QN AUTO: 32.4 PG (ref 26.5–33)
MCHC RBC AUTO-ENTMCNC: 33.7 G/DL (ref 31.5–36.5)
MCV RBC AUTO: 96 FL (ref 78–100)
MONOCYTES # BLD AUTO: 0.4 10E3/UL (ref 0–1.3)
MONOCYTES NFR BLD AUTO: 7 %
NEUTROPHILS # BLD AUTO: 4.4 10E3/UL (ref 1.6–8.3)
NEUTROPHILS NFR BLD AUTO: 69 %
NONHDLC SERPL-MCNC: 86 MG/DL
PLATELET # BLD AUTO: 273 10E3/UL (ref 150–450)
POTASSIUM SERPL-SCNC: 4.1 MMOL/L (ref 3.4–5.3)
PROT SERPL-MCNC: 7.2 G/DL (ref 6.4–8.3)
RBC # BLD AUTO: 4.57 10E6/UL (ref 3.8–5.2)
SODIUM SERPL-SCNC: 134 MMOL/L (ref 136–145)
TRIGL SERPL-MCNC: 70 MG/DL
WBC # BLD AUTO: 6.3 10E3/UL (ref 4–11)

## 2023-03-03 PROCEDURE — 99214 OFFICE O/P EST MOD 30 MIN: CPT | Mod: 25 | Performed by: INTERNAL MEDICINE

## 2023-03-03 PROCEDURE — 85025 COMPLETE CBC W/AUTO DIFF WBC: CPT | Performed by: INTERNAL MEDICINE

## 2023-03-03 PROCEDURE — 99397 PER PM REEVAL EST PAT 65+ YR: CPT | Performed by: INTERNAL MEDICINE

## 2023-03-03 PROCEDURE — 80061 LIPID PANEL: CPT | Performed by: INTERNAL MEDICINE

## 2023-03-03 PROCEDURE — 85652 RBC SED RATE AUTOMATED: CPT | Performed by: INTERNAL MEDICINE

## 2023-03-03 PROCEDURE — 36415 COLL VENOUS BLD VENIPUNCTURE: CPT | Performed by: INTERNAL MEDICINE

## 2023-03-03 PROCEDURE — 86140 C-REACTIVE PROTEIN: CPT | Performed by: INTERNAL MEDICINE

## 2023-03-03 PROCEDURE — 82550 ASSAY OF CK (CPK): CPT | Performed by: INTERNAL MEDICINE

## 2023-03-03 PROCEDURE — 80053 COMPREHEN METABOLIC PANEL: CPT | Performed by: INTERNAL MEDICINE

## 2023-03-03 ASSESSMENT — ACTIVITIES OF DAILY LIVING (ADL): CURRENT_FUNCTION: NO ASSISTANCE NEEDED

## 2023-03-03 ASSESSMENT — ENCOUNTER SYMPTOMS
CHILLS: 0
ABDOMINAL PAIN: 0
PARESTHESIAS: 0
PALPITATIONS: 0
DYSURIA: 0
COUGH: 0
WEAKNESS: 0
BREAST MASS: 0
NERVOUS/ANXIOUS: 0
HEARTBURN: 0
SORE THROAT: 0
DIARRHEA: 0
DIZZINESS: 0
HEMATOCHEZIA: 0
FREQUENCY: 1
HEADACHES: 0
NAUSEA: 0
CONSTIPATION: 0
HEMATURIA: 0
ARTHRALGIAS: 0
MYALGIAS: 1
EYE PAIN: 0
SHORTNESS OF BREATH: 0
FEVER: 0
JOINT SWELLING: 0

## 2023-03-03 ASSESSMENT — PAIN SCALES - GENERAL: PAINLEVEL: MILD PAIN (2)

## 2023-03-03 NOTE — PATIENT INSTRUCTIONS
Patient Education   Personalized Prevention Plan  You are due for the preventive services outlined below.  Your care team is available to assist you in scheduling these services.  If you have already completed any of these items, please share that information with your care team to update in your medical record.  Health Maintenance Due   Topic Date Due    ANNUAL REVIEW OF HM ORDERS  Never done    Pneumococcal Vaccine (2 - PCV) 02/13/2020    Flu Vaccine (1) Never done       Urinary Incontinence, Female (Adult)   Urinary incontinence means loss of bladder control. This problem affects many women, especially as they get older. If you have incontinence, you may be embarrassed to ask for help. But know that this problem can be treated.   Types of Incontinence  There are different types of incontinence. Two of the main types are described here. You can have more than one type.   Stress incontinence. With this type, urine leaks when pressure (stress) is put on the bladder. This may happen when you cough, sneeze, or laugh. Stress incontinence most often occurs because the pelvic floor muscles that support the bladder and urethra are weak. This can happen after pregnancy and vaginal childbirth or a hysterectomy. It can also be due to excess body weight or hormone changes.  Urge incontinence (also called overactive bladder). With this type, a sudden urge to urinate is felt often. This may happen even though there may not be much urine in the bladder. The need to urinate often during the night is common. Urge incontinence most often occurs because of bladder spasms. This may be due to bladder irritation or infection. Damage to bladder nerves or pelvic muscles, constipation, and certain medicines can also lead to urge incontinence.  Treatment depends on the cause. Further evaluation is needed to find the type you have. This will likely include an exam and certain tests. Based on the results, you and your healthcare provider  can then plan treatment. Until a diagnosis is made, the home care tips below can help ease symptoms.   Home care  Do pelvic floor muscle exercises, if they are prescribed. The pelvic floor muscles help support the bladder and urethra. Many women find that their symptoms improve when doing special exercises that strengthen these muscles. To do the exercises, contract the muscles you would use to stop your stream of urine. But do this when you re not urinating. Hold for 10 seconds, then relax. Repeat 10 to 20 times in a row, at least 3 times a day. Your healthcare provider may give you other instructions for how to do the exercises and how often.  Keep a bladder diary. This helps track how often and how much you urinate over a set period of time. Bring this diary with you to your next visit with the provider. The information can help your provider learn more about your bladder problem.  Lose weight, if advised to by your provider. Extra weight puts pressure on the bladder. Your provider can help you create a weight-loss plan that s right for you. This may include exercising more and making certain diet changes.  Don't have foods and drinks that may irritate the bladder. These can include alcohol and caffeinated drinks.  Quit smoking. Smoking and other tobacco use can lead to a long-term (chronic) cough that strains the pelvic floor muscles. Smoking may also damage the bladder and urethra. Talk with your provider about treatments or methods you can use to quit smoking.  If drinking large amounts of fluid makes you have symptoms, you may be advised to limit your fluid intake. You may also be advised to drink most of your fluids during the day and to limit fluids at night.  If you re worried about urine leakage or accidents, you may wear absorbent pads to catch urine. Change the pads often. This helps reduce discomfort. It may also reduce the risk of skin or bladder infections.    Follow-up care  Follow up with your  healthcare provider, or as directed. It may take some to find the right treatment for your problem. But healthy lifestyle changes can be made right away. These include such things as exercising on a regular basis, eating a healthy diet, losing weight (if needed), and quitting smoking. Your treatment plan may include special therapies or medicines. Certain procedures or surgery may also be options. Talk about any questions you have with your provider.   When to seek medical advice  Call the healthcare provider right away if any of these occur:  Fever of 100.4 F (38 C) or higher, or as directed by your provider  Bladder pain or fullness  Belly swelling  Nausea or vomiting  Back pain  Weakness, dizziness, or fainting  [employer] last reviewed this educational content on 1/1/2020 2000-2021 [employer]. All rights reserved. This information is not intended as a substitute for professional medical care. Always follow your healthcare professional's instructions.

## 2023-03-03 NOTE — PROGRESS NOTES
"  Patient has been advised of split billing requirements and indicates understanding: Yes  Are you in the first 12 months of your Medicare coverage?  No    Healthy Habits:     In general, how would you rate your overall health?  Good    Frequency of exercise:  2-3 days/week    Duration of exercise:  Greater than 60 minutes    Do you usually eat at least 4 servings of fruit and vegetables a day, include whole grains    & fiber and avoid regularly eating high fat or \"junk\" foods?  Yes    Taking medications regularly:  Yes    Medication side effects:  None    Ability to successfully perform activities of daily living:  No assistance needed    Home Safety:  No safety concerns identified    Hearing Impairment:  No hearing concerns    In the past 6 months, have you been bothered by leaking of urine? Yes    In general, how would you rate your overall mental or emotional health?  Excellent      PHQ-2 Total Score: 0    Additional concerns today:  Yes  Musculoskeletal Problem  Associated symptoms include myalgias. Pertinent negatives include no abdominal pain, arthralgias, chest pain, chills, congestion, coughing, fever, headaches, joint swelling, nausea, rash, sore throat or weakness.       Have you ever done Advance Care Planning? (For example, a Health Directive, POLST, or a discussion with a medical provider or your loved ones about your wishes): No, advance care planning information given to patient to review.  Patient plans to discuss their wishes with loved ones or provider.         Fall risk  Fallen 2 or more times in the past year?: No  Any fall with injury in the past year?: No    Cognitive Screening   1) Repeat 3 items (Leader, Season, Table)    2) Clock draw: NORMAL  3) 3 item recall: Recalls 3 objects  Results: 3 items recalled: COGNITIVE IMPAIRMENT LESS LIKELY    Mini-CogTM Copyright [employer]. Licensed by the author for use in [facility]; reprinted with permission (marcy@.[facility]). All rights " reserved.      Do you have sleep apnea, excessive snoring or daytime drowsiness?: no  Review of Systems   Constitutional: Negative for chills and fever.   HENT: Positive for hearing loss. Negative for congestion, ear pain and sore throat.    Eyes: Negative for pain and visual disturbance.   Respiratory: Negative for cough and shortness of breath.    Cardiovascular: Negative for chest pain, palpitations and peripheral edema.   Gastrointestinal: Negative for abdominal pain, constipation, diarrhea, heartburn, hematochezia and nausea.   Breasts:  Negative for tenderness, breast mass and discharge.   Genitourinary: Positive for frequency and urgency. Negative for dysuria, genital sores, hematuria, pelvic pain, vaginal bleeding and vaginal discharge.   Musculoskeletal: Positive for myalgias. Negative for arthralgias and joint swelling.   Skin: Negative for rash.   Neurological: Negative for dizziness, weakness, headaches and paresthesias.   Psychiatric/Behavioral: Negative for mood changes. The patient is not nervous/anxious.

## 2023-03-03 NOTE — PROGRESS NOTES
[facility]ists    Comprehensive and complex medical care - Chronic disease management - Shared decision making - Care coordination - Compassionate care    Patient advocacy - Rational deprescribing - Minimally disruptive medicine - Ethical focus - Customized care         Date of Service: 3/3/2023  Primary Provider: Rufino Fenton    Patient Care Team:  Rufino Fenton MD as PCP - General  Rufino Fenton MD as Assigned PCP  Shree Kruger MD as MD (Ophthalmology)  Federico Raza MD as MD (Orthopaedic Surgery)          Patient's Pharmacy:    [id number] - [place], [place] - [address]  [address]  [facility] [address]  Phone: [phone] Fax: [phone]     Patient's Contacts:  Name Home Phone Work Phone Mobile Phone Relationship Lgl MUMTAZ Duke   [phone] Other      Patient's Insurance:    Payor: [facility] / Plan: [facility] / Product Type: PPO /      Subjective:     History of present illness:    Sandra Modi is an 89 year old here for an annual wellness visit.    The issues she would like to address at today's visit include the following:    Chief Complaint   Patient presents with     Physical     Musculoskeletal Problem     Both upper arms have been in pain the last 6months.        Patient comes in today for annual wellness visit and for other issues.    We first reviewed her overall health.  She has generally been feeling well without concern.    She has been feeling more muscle pain.  This is more in the last 6 months.  She particularly notes that in her upper arms and shoulders bilaterally.  Reaching behind her can be difficult.  It can wake her up at night.  She is also noted some more frequent knee and hip pain, but this is not as prominent.  She does not really describe a.m. stiffness with this.  She can need something to sit up and help her get up with or hold onto.  She  uses as needed ibuprofen with help.    We reviewed her blood pressure and her blood pressure is elevated today.  She has had some degree of whitecoat component to this.  She does remain on her usual medications and is compliant.  She denies any chest pain or chest pressure.  She denies any shortness of breath.  She would be able to follow this as an outpatient if needed but has not done so recently.    We reviewed her hyperlipidemia and she does take rosuvastatin 3 times a week at this time.  She and I reviewed this as well.    We reviewed her urination issues.  She does not feel the Solifenacin is working as well for her at this time.  We reviewed different options related to this.  She has increasing urgency to go.    The patient denies any headaches.    We reviewed her other issues noted in the assessment but not specifically addressed in the HPI above.           Active Problem List:  Problem List as of 3/3/2023 Reviewed: 2/2/2022 10:54 PM by Rufino Fenton MD       Low    HLD (hyperlipidemia)    OAB (overactive bladder)       Other    Closed wedge compression fracture of T7 vertebra, initial encounter (H)       Other    Nonsmoker       Other    HTN (hypertension)        Past Medical History:   Diagnosis Date     HLD (hyperlipidemia) 3/24/2020     HTN (hypertension)      Nonsmoker       Past Surgical History:   Procedure Laterality Date     CATARACT EXTRACTION, BILATERAL  01/01/2020     RELEASE CARPAL TUNNEL Left 2022     TONSILLECTOMY  01/01/1937     WRIST FRACTURE SURGERY Right 01/01/2001      Family History   Problem Relation Age of Onset     Transient ischemic attack Mother      Hypertension Mother      Alzheimer Disease Father      Colon Cancer Father      Kidney Cancer Sister      No Known Problems Son      No Known Problems Daughter      No Known Problems Daughter       Family history is otherwise noncontributory.    Social History     Occupational History     Not on file   Tobacco Use     Smoking status:  Never     Smokeless tobacco: Never   Substance and Sexual Activity     Alcohol use: Not Currently     Drug use: Not on file     Sexual activity: Not on file      Social History     Social History Narrative    .  [place] [place]    Son is Dr. Vikas Modi, orthopedist at [employer] [facility] [facility] [employer].    Has 2 daughters who live in the [facility] area and 1 who lives in [place].    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  Previously lived in [place] ([id number]).    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      Current Outpatient Medications   Medication Instructions     amLODIPine-benazepril (LOTREL) 5-20 MG capsule TAKE 1 CAPSULE BY MOUTH DAILY     aspirin (ASA) 81 mg, DAILY     calcium carb/vit D3/minerals (CALCIUM-VITAMIN D ORAL) 1 tablet, 2 TIMES DAILY     chlorthalidone (HYGROTON) 25 MG tablet TAKE 1/2 TABLET(12.5 MG) BY MOUTH DAILY     loperamide (IMODIUM) 2 mg, Oral, TWICE WEEKLY     multivit-mins no.63/iron/folic (M-VIT ORAL) 1 tablet, DAILY     rosuvastatin (CRESTOR) 5 mg, Oral, FOUR TIMES WEEKLY     solifenacin (VESICARE) 10 MG tablet TAKE 1 TABLET(10 MG) BY MOUTH DAILY     vit C/E/zinc ox/harrison/lut/zeax (ICAPS AREDS2 ORAL) 1 tablet, 2 TIMES DAILY      Allergies: Sulfa (sulfonamide antibiotics) [sulfa drugs]     Immunization History   Administered Date(s) Administered     COVID-19 Vaccine 18+ (Moderna) 02/19/2021, 03/19/2021, 10/22/2021, 04/28/2022     COVID-19 Vaccine Bivalent Booster 12+ (Pfizer) 10/29/2022     Pneumococcal 23 valent 02/13/2019     Zoster vaccine recombinant adjuvanted (SHINGRIX) 09/18/2020, 11/19/2020      Objective:     Wt Readings from Last 3 Encounters:   03/03/23 55.3 kg (121 lb 14.4 oz)   03/25/22 56.2 kg (124 lb)   01/31/22 57.2 kg (126 lb)     BP Readings from Last 3 Encounters:   03/03/23 (!) 158/102   03/25/22 136/82   01/31/22 (!) 148/84       PHYSICAL EXAM  BP (!) 158/102 (BP Location: Right arm, Patient Position:  "Sitting, Cuff Size: Adult Regular)   Pulse 78   Ht 1.581 m (5' 2.25\")   Wt 55.3 kg (121 lb 14.4 oz)   SpO2 97%   BMI 22.12 kg/m     The patient is comfortable, no acute distress.  Mood high-energy.  Insight is good.  No skin lesions or nodules of concern.  Ears clear.  Eyes are nonicteric.  Pupils equal and reactive.  Throat is clear.  Neck is supple without mass, no thyromegaly. No cervical or epitrochlear adenopathy.  No axillary lymph nodes. Heart regular rate and rhythm.  Lungs clear to auscultation bilaterally.  Respiratory effort good.  Abdomen soft and nontender.  No hepatosplenomegaly.  Extremities show no edema.  No active synovitis on exam today.  She does have some impingement signs of both shoulders noted.  Her gait is good.    Diagnostics:     Office Visit on 03/25/2022   Component Date Value Ref Range Status     Ventricular Rate 03/25/2022 75  BPM Final     Atrial Rate 03/25/2022 75  BPM Final     AR Interval 03/25/2022 178  ms Final     QRS Duration 03/25/2022 72  ms Final     QT 03/25/2022 394  ms Final     QTc 03/25/2022 439  ms Final     P Axis 03/25/2022 74  degrees Final     R AXIS 03/25/2022 -37  degrees Final     T Axis 03/25/2022 38  degrees Final     Interpretation ECG 03/25/2022    Final                    Value:Sinus rhythm with frequent Premature ventricular complexes  Possible Left atrial enlargement  Left axis deviation  Septal infarct , age undetermined  Abnormal ECG  No previous ECGs available  Confirmed by SHAWNA RAMOS MD LOC: ([id number]) on 3/25/2022 10:46:20 AM       Sodium 03/25/2022 131 (L)  136 - 145 mmol/L Final     Potassium 03/25/2022 4.4  3.5 - 5.0 mmol/L Final     Chloride 03/25/2022 95 (L)  98 - 107 mmol/L Final     Carbon Dioxide (CO2) 03/25/2022 24  22 - 31 mmol/L Final     Anion Gap 03/25/2022 12  5 - 18 mmol/L Final     Urea Nitrogen 03/25/2022 12  8 - 28 mg/dL Final     Creatinine 03/25/2022 0.69  0.60 - 1.10 mg/dL Final     Calcium 03/25/2022 9.4  8.5 - 10.5 mg/dL " Final     Glucose 03/25/2022 101  70 - 125 mg/dL Final     GFR Estimate 03/25/2022 82  >60 mL/min/1.73m2 Final    Effective December 21, 2021 eGFRcr in adults is calculated using the 2021 CKD-EPI creatinine equation which includes age and gender (Ej et al., [facility], DOI: 10.1056/ZIQWfc8823840)     WBC Count 03/25/2022 6.6  4.0 - 11.0 10e3/uL Final     RBC Count 03/25/2022 4.27  3.80 - 5.20 10e6/uL Final     Hemoglobin 03/25/2022 14.1  11.7 - 15.7 g/dL Final     Hematocrit 03/25/2022 40.8  35.0 - 47.0 % Final     MCV 03/25/2022 96  78 - 100 fL Final     MCH 03/25/2022 33.0  26.5 - 33.0 pg Final     MCHC 03/25/2022 34.6  31.5 - 36.5 g/dL Final     RDW 03/25/2022 12.1  10.0 - 15.0 % Final     Platelet Count 03/25/2022 294  150 - 450 10e3/uL Final     Erythrocyte Sedimentation Rate 03/25/2022 40 (H)  0 - 20 mm/hr Final     CRP 03/25/2022 2.3 (H)  0.0-<0.8 mg/dL Final     Cholesterol 03/25/2022 154  <=199 mg/dL Final     Triglycerides 03/25/2022 69  <=149 mg/dL Final     Direct Measure HDL 03/25/2022 71  >=50 mg/dL Final    HDL Cholesterol Reference Range:     0-2 years:   No reference ranges established for patients under 2 years old  at [place]Â° for lipid analytes.    2-8 years:  Greater than 45 mg/dL     18 years and older:   Female: Greater than or equal to 50 mg/dL   Male:   Greater than or equal to 40 mg/dL     LDL Cholesterol Calculated 03/25/2022 69  <=129 mg/dL Final     Patient Fasting > 8hrs? 03/25/2022 Yes   Final     Rheumatoid Factor 03/25/2022 <15  0 - 30 IU/mL Final     Cyclic Citrullinated Peptide Antib* 03/25/2022 1.0  <=4.9 U/mL Final       Results for orders placed or performed in visit on 03/03/23   CK total     Status: Normal   Result Value Ref Range    CK 80 26 - 192 U/L   Lipid panel reflex to direct LDL Fasting     Status: Normal   Result Value Ref Range    Cholesterol 174 <200 mg/dL    Triglycerides 70 <150 mg/dL    Direct Measure HDL 88 >=50 mg/dL    LDL Cholesterol Calculated 72  <=100 mg/dL    Non HDL Cholesterol 86 <130 mg/dL    Narrative    Cholesterol  Desirable:  <200 mg/dL    Triglycerides  Normal:  Less than 150 mg/dL  Borderline High:  150-199 mg/dL  High:  200-499 mg/dL  Very High:  Greater than or equal to 500 mg/dL    Direct Measure HDL  Female:  Greater than or equal to 50 mg/dL   Male:  Greater than or equal to 40 mg/dL    LDL Cholesterol  Desirable:  <100mg/dL  Above Desirable:  100-129 mg/dL   Borderline High:  130-159 mg/dL   High:  160-189 mg/dL   Very High:  >= 190 mg/dL    Non HDL Cholesterol  Desirable:  130 mg/dL  Above Desirable:  130-159 mg/dL  Borderline High:  160-189 mg/dL  High:  190-219 mg/dL  Very High:  Greater than or equal to 220 mg/dL   CRP inflammation     Status: Normal   Result Value Ref Range    CRP Inflammation <3.00 <5.00 mg/L   Erythrocyte sedimentation rate auto     Status: Abnormal   Result Value Ref Range    Erythrocyte Sedimentation Rate 31 (H) 0 - 20 mm/hr   Comprehensive metabolic panel     Status: Abnormal   Result Value Ref Range    Sodium 134 (L) 136 - 145 mmol/L    Potassium 4.1 3.4 - 5.3 mmol/L    Chloride 95 (L) 98 - 107 mmol/L    Carbon Dioxide (CO2) 27 22 - 29 mmol/L    Anion Gap 12 7 - 15 mmol/L    Urea Nitrogen 11.6 8.0 - 23.0 mg/dL    Creatinine 0.60 0.51 - 0.95 mg/dL    Calcium 9.6 8.2 - 9.6 mg/dL    Glucose 101 (H) 70 - 99 mg/dL    Alkaline Phosphatase 110 (H) 35 - 104 U/L    AST 33 10 - 35 U/L    ALT 23 10 - 35 U/L    Protein Total 7.2 6.4 - 8.3 g/dL    Albumin 4.3 3.5 - 5.2 g/dL    Bilirubin Total 0.6 <=1.2 mg/dL    GFR Estimate 84 >60 mL/min/1.73m2   CBC with platelets and differential     Status: None   Result Value Ref Range    WBC Count 6.3 4.0 - 11.0 10e3/uL    RBC Count 4.57 3.80 - 5.20 10e6/uL    Hemoglobin 14.8 11.7 - 15.7 g/dL    Hematocrit 43.9 35.0 - 47.0 %    MCV 96 78 - 100 fL    MCH 32.4 26.5 - 33.0 pg    MCHC 33.7 31.5 - 36.5 g/dL    RDW 12.4 10.0 - 15.0 %    Platelet Count 273 150 - 450 10e3/uL    % Neutrophils 69 %     % Lymphocytes 22 %    % Monocytes 7 %    % Eosinophils 1 %    % Basophils 1 %    % Immature Granulocytes 0 %    Absolute Neutrophils 4.4 1.6 - 8.3 10e3/uL    Absolute Lymphocytes 1.4 0.8 - 5.3 10e3/uL    Absolute Monocytes 0.4 0.0 - 1.3 10e3/uL    Absolute Eosinophils 0.1 0.0 - 0.7 10e3/uL    Absolute Basophils 0.0 0.0 - 0.2 10e3/uL    Absolute Immature Granulocytes 0.0 <=0.4 10e3/uL   CBC with Platelets & Differential     Status: None    Narrative    The following orders were created for panel order CBC with Platelets & Differential.  Procedure                               Abnormality         Status                     ---------                               -----------         ------                     CBC with platelets and d...[[id number]]                      Final result                 Please view results for these tests on the individual orders.       Assessment:     1. Encounter for Medicare annual wellness exam    2. Mixed hyperlipidemia    3. Essential hypertension    4. Muscle pain    5. Primary hypertension    6. Closed wedge compression fracture of T7 vertebra, initial encounter (H)    7. OAB (overactive bladder)         Plan:     1. Blood work done today.  2. Could consider addition of spironolactone to her regimen.  She wishes to follow as an outpatient at this time when we called her related to this.  3. Can increase meds in the future if needed but be careful given her age.  4. She might try off of the rosuvastatin to see if this helps with her aches and pains.  If not, can resume if she wishes.  5. We could consider something like Myrbetriq for her bladder as a trial if needed.  6. Continue current medications  7. Follow up sooner if issues.    Orders Placed This Encounter   Procedures     Comprehensive metabolic panel     Erythrocyte sedimentation rate auto     CRP inflammation     Lipid panel reflex to direct LDL Fasting     CK total     CBC with platelets and differential     CBC with  Platelets & Differential        A personalized health plan based on the identified health risks was provided to the patient on the AVS.       Rufino Fenton MD  General Internal Medicine  [facility]      Return in 1 year (on 3/3/2024) for annual wellness visit.     Future Appointments   Date Time Provider Department Center   4/11/2023 10:00 AM Rufino Fenton MD [facility]         Health Maintenance   Topic Date Due     ANNUAL REVIEW OF  ORDERS  Never done     Pneumococcal Vaccine: 65+ Years (2 - PCV) 02/13/2020     INFLUENZA VACCINE (1) Never done     MEDICARE ANNUAL WELLNESS VISIT  03/03/2024     FALL RISK ASSESSMENT  03/03/2024     ADVANCE CARE PLANNING  03/07/2028     DTAP/TDAP/TD IMMUNIZATION (2 - Td or Tdap) 03/04/2031     PHQ-2 (once per calendar year)  Completed     ZOSTER IMMUNIZATION  Completed     COVID-19 Vaccine  Completed     IPV IMMUNIZATION  Aged Out     MENINGITIS IMMUNIZATION  Aged Out        Information on urinary incontinence and treatment options given to patient.

## 2023-03-05 ENCOUNTER — TELEPHONE (OUTPATIENT)
Dept: INTERNAL MEDICINE | Facility: CLINIC | Age: 88
End: 2023-03-05
Payer: OTHER GOVERNMENT

## 2023-03-05 NOTE — TELEPHONE ENCOUNTER
Please call patient -    ______________________________________________________________________     Home phone:  [phone] (home)     Cell phone:   Telephone Information:   Mobile [phone]       Other contacts:  Name Home Phone Work Phone Mobile Phone Relationship Lgl MUMTAZ Duke   [phone] Other      ______________________________________________________________________     Your blood counts are normal and you are not anemic.     Your kidney tests are normal.  Your electrolytes are also normal.  There is no signs of diabetes.  Your liver tests are normal.      Your cholesterol is doing well.     A muscle enzyme test (which can be affected by cholesterol medication) was normal.     One marker of inflammation mildly elevated, the other one was normal.    ISSUES TO REVIEW with the patient:    1. Blood pressure was high at visit    Recommend getting a blood pressure cuff and measure maybe once a week for a few months to get a pattern if willing.    I would recommend adding spironolactone 25 mg a day now or in the future if she wants to follow closely first.    Please get her thoughts on this.  We could have her follow up in clinic or over the phone on this in the future.    2.  Aches    This was a problem at the visit.    This is not likely due to a systemic joint problem.      It likely is some rotator cuff issues in the shoulders.    Could consider trying off the rosuvastatin (Crestor) for 1 month to see if this is contributing to the issues, but not a strong suggestion.  An option.    3.  Bladder    Could try mirabegron (Myrbetriq) 25 mg a day for bladder if wishes, but sometimes this could raise the blood pressure.    Could stay with the solifenacin at least for the time being until the blood pressure is better defined.    Please let me know what she decides.    Rufino Fenton MD  [facility]  3/5/2023, 11:08 AM      ______________________________________________________________________    Recent Results (from the past 240 hour(s))   CK total    Collection Time: 03/03/23  1:12 PM   Result Value Ref Range    CK 80 26 - 192 U/L   Lipid panel reflex to direct LDL Fasting    Collection Time: 03/03/23  1:12 PM   Result Value Ref Range    Cholesterol 174 <200 mg/dL    Triglycerides 70 <150 mg/dL    Direct Measure HDL 88 >=50 mg/dL    LDL Cholesterol Calculated 72 <=100 mg/dL    Non HDL Cholesterol 86 <130 mg/dL   CRP inflammation    Collection Time: 03/03/23  1:12 PM   Result Value Ref Range    CRP Inflammation <3.00 <5.00 mg/L   Erythrocyte sedimentation rate auto    Collection Time: 03/03/23  1:12 PM   Result Value Ref Range    Erythrocyte Sedimentation Rate 31 (H) 0 - 20 mm/hr   Comprehensive metabolic panel    Collection Time: 03/03/23  1:12 PM   Result Value Ref Range    Sodium 134 (L) 136 - 145 mmol/L    Potassium 4.1 3.4 - 5.3 mmol/L    Chloride 95 (L) 98 - 107 mmol/L    Carbon Dioxide (CO2) 27 22 - 29 mmol/L    Anion Gap 12 7 - 15 mmol/L    Urea Nitrogen 11.6 8.0 - 23.0 mg/dL    Creatinine 0.60 0.51 - 0.95 mg/dL    Calcium 9.6 8.2 - 9.6 mg/dL    Glucose 101 (H) 70 - 99 mg/dL    Alkaline Phosphatase 110 (H) 35 - 104 U/L    AST 33 10 - 35 U/L    ALT 23 10 - 35 U/L    Protein Total 7.2 6.4 - 8.3 g/dL    Albumin 4.3 3.5 - 5.2 g/dL    Bilirubin Total 0.6 <=1.2 mg/dL    GFR Estimate 84 >60 mL/min/1.73m2   CBC with platelets and differential    Collection Time: 03/03/23  1:12 PM   Result Value Ref Range    WBC Count 6.3 4.0 - 11.0 10e3/uL    RBC Count 4.57 3.80 - 5.20 10e6/uL    Hemoglobin 14.8 11.7 - 15.7 g/dL    Hematocrit 43.9 35.0 - 47.0 %    MCV 96 78 - 100 fL    MCH 32.4 26.5 - 33.0 pg    MCHC 33.7 31.5 - 36.5 g/dL    RDW 12.4 10.0 - 15.0 %    Platelet Count 273 150 - 450 10e3/uL    % Neutrophils 69 %    % Lymphocytes 22 %    % Monocytes 7 %    % Eosinophils 1 %    % Basophils 1 %    % Immature Granulocytes 0 %    Absolute  Neutrophils 4.4 1.6 - 8.3 10e3/uL    Absolute Lymphocytes 1.4 0.8 - 5.3 10e3/uL    Absolute Monocytes 0.4 0.0 - 1.3 10e3/uL    Absolute Eosinophils 0.1 0.0 - 0.7 10e3/uL    Absolute Basophils 0.0 0.0 - 0.2 10e3/uL    Absolute Immature Granulocytes 0.0 <=0.4 10e3/uL      ______________________________________________________________________

## 2023-03-05 NOTE — LETTER
March 6, 2023      Sandra M Ly  [address]  [facility] [address]        Dear ,    We are writing to inform you of your test results.    Test results indicate you may require additional follow up, see comment below.    Resulted Orders   CK total   Result Value Ref Range    CK 80 26 - 192 U/L   Lipid panel reflex to direct LDL Fasting   Result Value Ref Range    Cholesterol 174 <200 mg/dL    Triglycerides 70 <150 mg/dL    Direct Measure HDL 88 >=50 mg/dL    LDL Cholesterol Calculated 72 <=100 mg/dL    Non HDL Cholesterol 86 <130 mg/dL    Narrative    Cholesterol  Desirable:  <200 mg/dL    Triglycerides  Normal:  Less than 150 mg/dL  Borderline High:  150-199 mg/dL  High:  200-499 mg/dL  Very High:  Greater than or equal to 500 mg/dL    Direct Measure HDL  Female:  Greater than or equal to 50 mg/dL   Male:  Greater than or equal to 40 mg/dL    LDL Cholesterol  Desirable:  <100mg/dL  Above Desirable:  100-129 mg/dL   Borderline High:  130-159 mg/dL   High:  160-189 mg/dL   Very High:  >= 190 mg/dL    Non HDL Cholesterol  Desirable:  130 mg/dL  Above Desirable:  130-159 mg/dL  Borderline High:  160-189 mg/dL  High:  190-219 mg/dL  Very High:  Greater than or equal to 220 mg/dL   CRP inflammation   Result Value Ref Range    CRP Inflammation <3.00 <5.00 mg/L   Erythrocyte sedimentation rate auto   Result Value Ref Range    Erythrocyte Sedimentation Rate 31 (H) 0 - 20 mm/hr   Comprehensive metabolic panel   Result Value Ref Range    Sodium 134 (L) 136 - 145 mmol/L    Potassium 4.1 3.4 - 5.3 mmol/L    Chloride 95 (L) 98 - 107 mmol/L    Carbon Dioxide (CO2) 27 22 - 29 mmol/L    Anion Gap 12 7 - 15 mmol/L    Urea Nitrogen 11.6 8.0 - 23.0 mg/dL    Creatinine 0.60 0.51 - 0.95 mg/dL    Calcium 9.6 8.2 - 9.6 mg/dL    Glucose 101 (H) 70 - 99 mg/dL    Alkaline Phosphatase 110 (H) 35 - 104 U/L    AST 33 10 - 35 U/L      Comment:      Specimen is hemolyzed which can falsely elevate AST. Analysis of a  non-hemolyzed specimen may result in a lower value.    ALT 23 10 - 35 U/L    Protein Total 7.2 6.4 - 8.3 g/dL    Albumin 4.3 3.5 - 5.2 g/dL    Bilirubin Total 0.6 <=1.2 mg/dL    GFR Estimate 84 >60 mL/min/1.73m2      Comment:      eGFR calculated using 2021 CKD-EPI equation.   CBC with platelets and differential   Result Value Ref Range    WBC Count 6.3 4.0 - 11.0 10e3/uL    RBC Count 4.57 3.80 - 5.20 10e6/uL    Hemoglobin 14.8 11.7 - 15.7 g/dL    Hematocrit 43.9 35.0 - 47.0 %    MCV 96 78 - 100 fL    MCH 32.4 26.5 - 33.0 pg    MCHC 33.7 31.5 - 36.5 g/dL    RDW 12.4 10.0 - 15.0 %    Platelet Count 273 150 - 450 10e3/uL    % Neutrophils 69 %    % Lymphocytes 22 %    % Monocytes 7 %    % Eosinophils 1 %    % Basophils 1 %    % Immature Granulocytes 0 %    Absolute Neutrophils 4.4 1.6 - 8.3 10e3/uL    Absolute Lymphocytes 1.4 0.8 - 5.3 10e3/uL    Absolute Monocytes 0.4 0.0 - 1.3 10e3/uL    Absolute Eosinophils 0.1 0.0 - 0.7 10e3/uL    Absolute Basophils 0.0 0.0 - 0.2 10e3/uL    Absolute Immature Granulocytes 0.0 <=0.4 10e3/uL     Your blood counts are normal and you are not anemic.     Your kidney tests are normal.  Your electrolytes are also normal.  There is no signs of diabetes.  Your liver tests are normal.      Your cholesterol is doing well.     A muscle enzyme test (which can be affected by cholesterol medication) was normal.     One marker of inflammation mildly elevated, the other one was normal.    ISSUES TO REVIEW with the patient:    1. Blood pressure was high at visit    Recommend getting a blood pressure cuff and measure maybe once a week for a few months to get a pattern if willing.    I would recommend adding spironolactone 25 mg a day now or in the future if she wants to follow closely first.    Please get her thoughts on this.  We could have her follow up in clinic or over the phone on this in the future.    2.  Aches    This was a problem at the visit.    This is not likely due to a systemic joint  problem.      It likely is some rotator cuff issues in the shoulders.    Could consider trying off the rosuvastatin (Crestor) for 1 month to see if this is contributing to the issues, but not a strong suggestion.  An option.    3.  Bladder    Could try mirabegron (Myrbetriq) 25 mg a day for bladder if wishes, but sometimes this could raise the blood pressure.    Could stay with the solifenacin at least for the time being until the blood pressure is better defined.    Please let contact the clinic and let us know how you want to proceed.      If you have any questions or concerns, please call the clinic at the number listed above.       Sincerely,    Dr. Fenton's care team

## 2023-03-06 NOTE — TELEPHONE ENCOUNTER
Attempted to contact patient to review results and message from PCP, no answer and voicemail full.  Will send mychart message and letter as well.

## 2023-03-07 NOTE — TELEPHONE ENCOUNTER
"Contacted patient and reviewed below message and recommendations from PCP-    1. Getting new batteries for home bp monitor today.  Wants to hold off on adding aldactone for now, will monitor BP and update care team as needed.    2. Is going to try holding crestor for 1 month, states \"I only take it 3 times weekly, but I would like to try this.\"  Emphasized that this was not a strong recommendation, to track when she stops and to update care team in 1 month.  If no improvement, PCP would definitely want her back on the crestor.    3.  Just got one month of current med, would like to use this first then decide about Myrbetriq.      Patient was scheduled for phone visit with PCP to follow up on 4/11/23.  "

## 2023-03-13 ENCOUNTER — TELEPHONE (OUTPATIENT)
Dept: INTERNAL MEDICINE | Facility: CLINIC | Age: 88
End: 2023-03-13
Payer: OTHER GOVERNMENT

## 2023-03-13 DIAGNOSIS — N32.81 OAB (OVERACTIVE BLADDER): Primary | ICD-10-CM

## 2023-03-13 NOTE — TELEPHONE ENCOUNTER
Patient calling to report     1. Stopped rosuvastatin for a month  - No difference  -Still ache and pains as before cant say better or worse    BP recommendation  -Got new batteries  -Saturday 3:05 /73 pulse 43  -Sunday 3: 05 148/99 pulse of 66   -Will continue readings     -Spironolactone, She would like to hold on a wait    -Mirabegron- would like to wait     Doesn't like changing a lot at he same time due to doesn't know what is doing what!     Call Back   [phone]

## 2023-03-14 RX ORDER — MIRABEGRON 25 MG/1
25 TABLET, FILM COATED, EXTENDED RELEASE ORAL DAILY
Qty: 90 TABLET | Refills: 3 | Status: SHIPPED | OUTPATIENT
Start: 2023-03-14 | End: 2023-04-11

## 2023-03-14 NOTE — TELEPHONE ENCOUNTER
Spoke to patient- relayed message, verbalized understanding.     Would like to know if she can be prescribed the alternative for solifenacin (VESICARE) 10 MG tablet

## 2023-03-14 NOTE — TELEPHONE ENCOUNTER
Okay to restart rosuvastatin (Crestor) if wishes.    Follow blood pressure and let me know if consistently above 150/90.    Can see orthopedics in the future at [facility] if shoulder pain worsens.    Thank you,    Rufino Fenton MD  General Internal Medicine  [facility]  3/14/2023, 9:44 AM

## 2023-03-15 ENCOUNTER — TELEPHONE (OUTPATIENT)
Dept: INTERNAL MEDICINE | Facility: CLINIC | Age: 88
End: 2023-03-15
Payer: OTHER GOVERNMENT

## 2023-03-17 NOTE — TELEPHONE ENCOUNTER
PA Initiation    Medication: mirabegron (MYRBETRIQ) 25 MG 24 hr tablet - Initiated  Insurance Company: Other (see comments)Comment:  [facility]  Pharmacy Filling the Rx: [id number] - [place], [place] - [address] AT [facility] [address]  Filling Pharmacy Phone: [phone]  Filling Pharmacy Fax: [phone]  Start Date: 3/17/2023    PLAN MANUALLY FAXED US QUESTION SET

## 2023-03-21 NOTE — TELEPHONE ENCOUNTER
PRIOR AUTHORIZATION DENIED    Medication: mirabegron (MYRBETRIQ) 25 MG 24 hr tablet - Denied    Denial Date: 3/17/2023    Denial Rational:            Appeal Information:

## 2023-03-24 ENCOUNTER — TRANSFERRED RECORDS (OUTPATIENT)
Dept: HEALTH INFORMATION MANAGEMENT | Facility: CLINIC | Age: 88
End: 2023-03-24
Payer: OTHER GOVERNMENT

## 2023-03-31 DIAGNOSIS — I10 ESSENTIAL HYPERTENSION: ICD-10-CM

## 2023-03-31 RX ORDER — CHLORTHALIDONE 25 MG/1
TABLET ORAL
Qty: 45 TABLET | Refills: 3 | Status: SHIPPED | OUTPATIENT
Start: 2023-03-31 | End: 2023-06-13

## 2023-03-31 NOTE — TELEPHONE ENCOUNTER
"Routing refill request to provider for review/approval because:  Labs out of range:  Last  on 3/3/2023  Blood pressure    Last Written Prescription Date:  3/8/2022  Last Fill Quantity: 45,  # refills: 3   Last office visit provider:  3/3/2023     Requested Prescriptions   Pending Prescriptions Disp Refills     chlorthalidone (HYGROTON) 25 MG tablet [Pharmacy Med Name: CHLORTHALIDONE 25MG TABLETS] 45 tablet 3     Sig: TAKE 1/2 TABLET(12.5 MG) BY MOUTH DAILY       Diuretics (Including Combos) Protocol Failed - 3/31/2023  2:37 PM        Failed - Blood pressure under 140/90 in past 12 months     BP Readings from Last 3 Encounters:   03/03/23 (!) 158/102   03/25/22 136/82   01/31/22 (!) 148/84                 Failed - Normal serum sodium on file in past 12 months     Recent Labs   Lab Test 03/03/23  1312   *              Passed - Recent (12 mo) or future (30 days) visit within the authorizing provider's specialty     Patient has had an office visit with the authorizing provider or a provider within the authorizing providers department within the previous 12 mos or has a future within next 30 days. See \"Patient Info\" tab in inbasket, or \"Choose Columns\" in Meds & Orders section of the refill encounter.              Passed - Medication is active on med list        Passed - Patient is age 18 or older        Passed - No active pregancy on record        Passed - Normal serum creatinine on file in past 12 months     Recent Labs   Lab Test 03/03/23  1312   CR 0.60              Passed - Normal serum potassium on file in past 12 months     Recent Labs   Lab Test 03/03/23  1312   POTASSIUM 4.1                    Passed - No positive pregnancy test in past 12 months             Diamond Tiwari, RN 03/31/23 2:37 PM  "

## 2023-04-03 ENCOUNTER — TELEPHONE (OUTPATIENT)
Dept: INTERNAL MEDICINE | Facility: CLINIC | Age: 88
End: 2023-04-03
Payer: OTHER GOVERNMENT

## 2023-04-03 DIAGNOSIS — N32.81 OAB (OVERACTIVE BLADDER): ICD-10-CM

## 2023-04-03 DIAGNOSIS — Z00.00 ENCOUNTER FOR MEDICARE ANNUAL WELLNESS EXAM: Primary | ICD-10-CM

## 2023-04-03 RX ORDER — SOLIFENACIN SUCCINATE 10 MG/1
10 TABLET, FILM COATED ORAL DAILY
COMMUNITY
Start: 2022-05-31 | End: 2023-04-03

## 2023-04-03 RX ORDER — SOLIFENACIN SUCCINATE 10 MG/1
10 TABLET, FILM COATED ORAL DAILY
Qty: 90 TABLET | Refills: 3 | Status: SHIPPED | OUTPATIENT
Start: 2023-04-03 | End: 2024-04-03

## 2023-04-03 NOTE — TELEPHONE ENCOUNTER
"Called patient. Informed her of below message from PCP. Patient states her blood pressure readings have been \"all over the place.\" Patient states she is unsure how to send readings via [employer]. She states her highest readings have been around 151/92 and 148/97. Lower readings of 115/86 and 117/73. Patient otherwise denies any other concerns or symptoms. Writer informed patient to bring in BP readings to her next appt on 4/11.     Patient thanked for call and had no other questions.   "

## 2023-04-03 NOTE — TELEPHONE ENCOUNTER
Done.  Notify the patient    Rufino Fenton MD  General Internal Medicine  [facility]  4/3/2023, 2:29 PM

## 2023-04-03 NOTE — TELEPHONE ENCOUNTER
Patient calling to get a medication refill on medications attached    solifenacin (VESICARE) 10 MG tablet (Discontinued)    The new prescription of mirabegron (MYRBETRIQ) 25 MG 24 hr tablet was denied by prior Auth as well would cost $1000 for a co pay    Patient requesting to get back on solifenacin (VESICARE) 10 MG tablet just till the appt on 4/11/2023    Patient never picked up mirabegron (MYRBETRIQ) 25 MG 24 hr tablet due to co pay     Patient would be ok with just staying on the medication she was on before     Down to 1 pill left     [phone] (Home Phone)

## 2023-04-10 ENCOUNTER — TRANSFERRED RECORDS (OUTPATIENT)
Dept: HEALTH INFORMATION MANAGEMENT | Facility: CLINIC | Age: 88
End: 2023-04-10
Payer: OTHER GOVERNMENT

## 2023-04-11 ENCOUNTER — MEDICAL CORRESPONDENCE (OUTPATIENT)
Dept: HEALTH INFORMATION MANAGEMENT | Facility: CLINIC | Age: 88
End: 2023-04-11

## 2023-04-11 ENCOUNTER — OFFICE VISIT (OUTPATIENT)
Dept: INTERNAL MEDICINE | Facility: CLINIC | Age: 88
End: 2023-04-11
Payer: OTHER GOVERNMENT

## 2023-04-11 VITALS
HEART RATE: 77 BPM | TEMPERATURE: 97.5 F | BODY MASS INDEX: 22.45 KG/M2 | HEIGHT: 62 IN | OXYGEN SATURATION: 95 % | DIASTOLIC BLOOD PRESSURE: 94 MMHG | SYSTOLIC BLOOD PRESSURE: 191 MMHG | WEIGHT: 122 LBS

## 2023-04-11 DIAGNOSIS — M79.10 MUSCLE PAIN: ICD-10-CM

## 2023-04-11 DIAGNOSIS — I10 PRIMARY HYPERTENSION: Primary | Chronic | ICD-10-CM

## 2023-04-11 DIAGNOSIS — N32.81 OAB (OVERACTIVE BLADDER): ICD-10-CM

## 2023-04-11 DIAGNOSIS — G43.109 OCULAR MIGRAINE: ICD-10-CM

## 2023-04-11 PROCEDURE — 99214 OFFICE O/P EST MOD 30 MIN: CPT | Performed by: INTERNAL MEDICINE

## 2023-04-11 ASSESSMENT — PAIN SCALES - GENERAL: PAINLEVEL: MILD PAIN (2)

## 2023-04-11 NOTE — PROGRESS NOTES
[facility] - Primary [facility] Specialists    Comprehensive and complex medical care - Chronic disease management - Shared decision making - Care coordination - Compassionate care    Patient advocacy - Rational deprescribing - Minimally disruptive medicine - Ethical focus - Customized care         Date of Service: 4/11/2023  Primary Provider: Rufino Fenton    Patient Care Team:  Rufino Fenton MD as PCP - General  Rufino Fenton MD as Assigned PCP  Shree Kruger MD as MD (Ophthalmology)  Federico Raza MD as MD (Orthopaedic Surgery)          Patient's Pharmacy:    [id number] - [place], [place] - [address]  [address]  [facility] [address]  Phone: [phone] Fax: [phone]     Patient's Contacts:  Name Home Phone Work Phone Mobile Phone Relationship Lgl Geraldine   JUAN MANUELMUMTAZ CARSON   [phone] Other      Patient's Insurance:    Payor: [facility] / Plan: [facility] [employer] / Product Type: PPO /           Active Problem List:  Problem List as of 4/11/2023 Reviewed: 3/7/2023  5:02 PM by Rufino Fenton MD       High    Nonsmoker       Medium    HTN (hypertension)    Closed wedge compression fracture of T7 vertebra, initial encounter (H)       Low    HLD (hyperlipidemia)    OAB (overactive bladder)        Current Outpatient Medications   Medication Instructions     amLODIPine-benazepril (LOTREL) 5-20 MG capsule TAKE 1 CAPSULE BY MOUTH DAILY     aspirin (ASA) 81 mg, DAILY     calcium carb/vit D3/minerals (CALCIUM-VITAMIN D ORAL) 1 tablet, 2 TIMES DAILY     chlorthalidone (HYGROTON) 25 MG tablet TAKE 1/2 TABLET(12.5 MG) BY MOUTH DAILY     loperamide (IMODIUM) 2 mg, Oral, TWICE WEEKLY     multivit-mins no.63/iron/folic (M-VIT ORAL) 1 tablet, DAILY     rosuvastatin (CRESTOR) 5 mg, Oral, FOUR TIMES WEEKLY     solifenacin (VESICARE) 10 mg, Oral, DAILY     vit C/E/zinc ox/harrison/lut/zeax (ICAPS AREDS2 ORAL) 1 tablet, 2 TIMES DAILY     Social  History     Social History Narrative    .  Lives in the [facility] near [place].    Son is Dr. Vikas Modi, orthopedist at [facility].    Has 2 daughters who live in the [facility] area and 1 who lives in [place].    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  Previously lived in [place] ([id number]).    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       Subjective:     Sandra Modi is a 89 year old female who comes in today for:    Chief Complaint   Patient presents with     Follow Up     Medication and aches and pains.      Hypertension     Follow up          4/11/2023     9:58 AM   Additional Questions   Roomed by DEYA Baptiste   Accompanied by LONA     Patient comes in today for follow-up of issues raised at the last annual wellness visit.    We reviewed her arm pain and she did see Dr. Raza related to this.  No new changes were made at this point.  She does note pain at the biceps and at her elbows.  Her blood work at the time of her visit was good.    We reviewed her overactive bladder.  She does have issues with incontinence at times.  We will continue her on the Solifenacin as her insurance covers this.  They do not cover Myrbetriq.  Other options reviewed but she is okay with managing it as she has been at this point.    We reviewed her blood pressure.  She is been following this as an outpatient.  Her blood pressures are generally below 150/90.  She is taking her medications regularly.  She occasionally has a low blood pressure and a low pulse.  We talked about the benefits or risk.  Right now, we agreed to not change anything and follow.  She denies any new cardiac symptoms.  We reviewed recent eye symptoms she has been having.    She occasionally gets flashing in her visual field.  She has had this off and on for the last 15 years.  It might occur about once a month at the most.  She denies any headaches for it.  It lasts maybe about 5 minutes.  She  "does get occasional symptoms in the middle of the night when she wakes up and again it lasts about 5 minutes.  This might happen up to 2 times a month.  She recently saw her eye doctor and discussed this with him as well.    We reviewed her other issues noted in the assessment but not specifically addressed in the HPI above.     Objective:     Wt Readings from Last 3 Encounters:   04/11/23 55.3 kg (122 lb)   03/03/23 55.3 kg (121 lb 14.4 oz)   03/25/22 56.2 kg (124 lb)     BP Readings from Last 3 Encounters:   04/11/23 (!) 191/94   03/03/23 (!) 158/102   03/25/22 136/82     BP (!) 191/94 (BP Location: Right arm, Patient Position: Sitting, Cuff Size: Adult Regular)   Pulse 77   Temp 97.5  F (36.4  C) (Oral)   Ht 1.581 m (5' 2.25\")   Wt 55.3 kg (122 lb)   SpO2 95%   BMI 22.14 kg/m     The patient is comfortable, no acute distress.  Mood good.  Insight fair to good.  Eyes are nonicteric.  Neck is supple without mass.  No cervical adenopathy.  No thyromegaly. Heart regular rate and rhythm.  Lungs clear to auscultation bilaterally.  Respiratory effort is good.  Extremities no edema.      Diagnostics:     Office Visit on 03/03/2023   Component Date Value Ref Range Status     CK 03/03/2023 80  26 - 192 U/L Final     Cholesterol 03/03/2023 174  <200 mg/dL Final     Triglycerides 03/03/2023 70  <150 mg/dL Final     Direct Measure HDL 03/03/2023 88  >=50 mg/dL Final     LDL Cholesterol Calculated 03/03/2023 72  <=100 mg/dL Final     Non HDL Cholesterol 03/03/2023 86  <130 mg/dL Final     CRP Inflammation 03/03/2023 <3.00  <5.00 mg/L Final     Erythrocyte Sedimentation Rate 03/03/2023 31 (H)  0 - 20 mm/hr Final     Sodium 03/03/2023 134 (L)  136 - 145 mmol/L Final     Potassium 03/03/2023 4.1  3.4 - 5.3 mmol/L Final     Chloride 03/03/2023 95 (L)  98 - 107 mmol/L Final     Carbon Dioxide (CO2) 03/03/2023 27  22 - 29 mmol/L Final     Anion Gap 03/03/2023 12  7 - 15 mmol/L Final     Urea Nitrogen 03/03/2023 11.6  8.0 - " 23.0 mg/dL Final     Creatinine 03/03/2023 0.60  0.51 - 0.95 mg/dL Final     Calcium 03/03/2023 9.6  8.2 - 9.6 mg/dL Final     Glucose 03/03/2023 101 (H)  70 - 99 mg/dL Final     Alkaline Phosphatase 03/03/2023 110 (H)  35 - 104 U/L Final     AST 03/03/2023 33  10 - 35 U/L Final    Specimen is hemolyzed which can falsely elevate AST. Analysis of a non-hemolyzed specimen may result in a lower value.     ALT 03/03/2023 23  10 - 35 U/L Final     Protein Total 03/03/2023 7.2  6.4 - 8.3 g/dL Final     Albumin 03/03/2023 4.3  3.5 - 5.2 g/dL Final     Bilirubin Total 03/03/2023 0.6  <=1.2 mg/dL Final     GFR Estimate 03/03/2023 84  >60 mL/min/1.73m2 Final    eGFR calculated using 2021 CKD-EPI equation.     WBC Count 03/03/2023 6.3  4.0 - 11.0 10e3/uL Final     RBC Count 03/03/2023 4.57  3.80 - 5.20 10e6/uL Final     Hemoglobin 03/03/2023 14.8  11.7 - 15.7 g/dL Final     Hematocrit 03/03/2023 43.9  35.0 - 47.0 % Final     MCV 03/03/2023 96  78 - 100 fL Final     MCH 03/03/2023 32.4  26.5 - 33.0 pg Final     MCHC 03/03/2023 33.7  31.5 - 36.5 g/dL Final     RDW 03/03/2023 12.4  10.0 - 15.0 % Final     Platelet Count 03/03/2023 273  150 - 450 10e3/uL Final     % Neutrophils 03/03/2023 69  % Final     % Lymphocytes 03/03/2023 22  % Final     % Monocytes 03/03/2023 7  % Final     % Eosinophils 03/03/2023 1  % Final     % Basophils 03/03/2023 1  % Final     % Immature Granulocytes 03/03/2023 0  % Final     Absolute Neutrophils 03/03/2023 4.4  1.6 - 8.3 10e3/uL Final     Absolute Lymphocytes 03/03/2023 1.4  0.8 - 5.3 10e3/uL Final     Absolute Monocytes 03/03/2023 0.4  0.0 - 1.3 10e3/uL Final     Absolute Eosinophils 03/03/2023 0.1  0.0 - 0.7 10e3/uL Final     Absolute Basophils 03/03/2023 0.0  0.0 - 0.2 10e3/uL Final     Absolute Immature Granulocytes 03/03/2023 0.0  <=0.4 10e3/uL Final       No results found for any visits on 04/11/23.    Assessment:     1. Primary hypertension    2. OAB (overactive bladder)    3. Muscle pain     4. Ocular migraine        Plan:     1. No change in blood pressure medication.  2. Continue Solifenacin for bladder.  3. Could consider additional medication like a beta-blocker for her ocular migraines, but at this time no change.  4. Continue current medications otherwise.  5. Follow up sooner if issues.    No orders of the defined types were placed in this encounter.          Rufino Fenton MD  General Internal Medicine  [facility]    Return in about 11 months (around 3/4/2024), or if symptoms worsen or fail to improve, for annual wellness visit.     No future appointments.

## 2023-04-12 NOTE — PATIENT INSTRUCTIONS
Preventative Measures:  Health Maintenance   Topic Date Due    ANNUAL REVIEW OF HM ORDERS  Never done    INFLUENZA VACCINE (1) Never done    MEDICARE ANNUAL WELLNESS VISIT  03/03/2024    FALL RISK ASSESSMENT  03/03/2024    ADVANCE CARE PLANNING  03/07/2028    DTAP/TDAP/TD IMMUNIZATION (2 - Td or Tdap) 03/04/2031    PHQ-2 (once per calendar year)  Completed    ZOSTER IMMUNIZATION  Completed    COVID-19 Vaccine  Completed    IPV IMMUNIZATION  Aged Out    MENINGITIS IMMUNIZATION  Aged Out    Pneumococcal Vaccine: 65+ Years  Discontinued

## 2023-05-13 ENCOUNTER — HEALTH MAINTENANCE LETTER (OUTPATIENT)
Age: 88
End: 2023-05-13

## 2023-06-09 ENCOUNTER — TRANSFERRED RECORDS (OUTPATIENT)
Dept: HEALTH INFORMATION MANAGEMENT | Facility: CLINIC | Age: 88
End: 2023-06-09
Payer: OTHER GOVERNMENT

## 2023-06-13 DIAGNOSIS — I10 ESSENTIAL HYPERTENSION: ICD-10-CM

## 2023-06-13 DIAGNOSIS — E78.2 MIXED HYPERLIPIDEMIA: ICD-10-CM

## 2023-06-13 NOTE — TELEPHONE ENCOUNTER
Patient calling to request new Rx for the attached 3 medications.  She has changed pharmacies and no longer uses the [facility] that scripts were last sent to.  Please send new Rxs to [facility] in [place] (attached).

## 2023-06-14 RX ORDER — CHLORTHALIDONE 25 MG/1
12.5 TABLET ORAL DAILY
Qty: 45 TABLET | Refills: 1 | Status: SHIPPED | OUTPATIENT
Start: 2023-06-14 | End: 2023-12-29

## 2023-06-14 RX ORDER — ROSUVASTATIN CALCIUM 5 MG/1
5 TABLET, COATED ORAL
Qty: 48 TABLET | Refills: 3 | Status: SHIPPED | OUTPATIENT
Start: 2023-06-15 | End: 2024-06-30

## 2023-06-14 RX ORDER — AMLODIPINE AND BENAZEPRIL HYDROCHLORIDE 5; 20 MG/1; MG/1
1 CAPSULE ORAL DAILY
Qty: 90 CAPSULE | Refills: 2 | Status: SHIPPED | OUTPATIENT
Start: 2023-06-14 | End: 2024-06-30

## 2023-06-14 NOTE — TELEPHONE ENCOUNTER
"Routing refill request to provider for review/approval because:    amLODIPine-benazepril (LOTREL)  Last Written Prescription Date:  5/30/23  Last Fill Quantity: 90,  # refills: 3   -phamracy changed, adjusted for refills    rosuvastatin (CRESTOR)  Last Written Prescription Date:  8/12/22  Last Fill Quantity: 48,  # refills: 3   -fail: early refill request    chlorthalidone (HYGROTON)  Last Written Prescription Date:  3/31/23  Last Fill Quantity: 45,  # refills: 3   -phamracy changed, adjusted for refills    Last office visit provider:  4/11/23 with caity    Requested Prescriptions   Pending Prescriptions Disp Refills     amLODIPine-benazepril (LOTREL) 5-20 MG capsule 90 capsule 3     Sig: Take 1 capsule by mouth daily       Calcium Channel Blockers Protocol  Failed - 6/14/2023  1:14 PM        Failed - Blood pressure under 140/90 in past 12 months     BP Readings from Last 3 Encounters:   04/11/23 (!) 191/94   03/03/23 (!) 158/102   03/25/22 136/82                 Passed - Recent (12 mo) or future (30 days) visit within the authorizing provider's specialty     Patient has had an office visit with the authorizing provider or a provider within the authorizing providers department within the previous 12 mos or has a future within next 30 days. See \"Patient Info\" tab in inbasket, or \"Choose Columns\" in Meds & Orders section of the refill encounter.              Passed - Medication is active on med list        Passed - Patient is age 18 or older        Passed - No active pregnancy on record        Passed - Normal serum creatinine on file in past 12 months     Recent Labs   Lab Test 03/03/23  1312   CR 0.60       Ok to refill medication if creatinine is low          Passed - No positive pregnancy test in past 12 months       ACE Inhibitors (Including Combos) Protocol Failed - 6/14/2023  1:14 PM        Failed - Blood pressure under 140/90 in past 12 months     BP Readings from Last 3 Encounters:   04/11/23 (!) 191/94 " "  03/03/23 (!) 158/102   03/25/22 136/82                 Passed - Recent (12 mo) or future (30 days) visit within the authorizing provider's specialty     Patient has had an office visit with the authorizing provider or a provider within the authorizing providers department within the previous 12 mos or has a future within next 30 days. See \"Patient Info\" tab in inbasket, or \"Choose Columns\" in Meds & Orders section of the refill encounter.              Passed - Medication is active on med list        Passed - Patient is age 18 or older        Passed - No active pregnancy on record        Passed - Normal serum creatinine on file in past 12 months     Recent Labs   Lab Test 03/03/23  1312   CR 0.60       Ok to refill medication if creatinine is low          Passed - Normal serum potassium on file in past 12 months     Recent Labs   Lab Test 03/03/23  1312   POTASSIUM 4.1             Passed - No positive pregnancy test within past 12 months           rosuvastatin (CRESTOR) 5 MG tablet 48 tablet 3     Sig: Take 1 tablet (5 mg) by mouth four times a week       Statins Protocol Passed - 6/14/2023  1:14 PM        Passed - LDL on file in past 12 months     Recent Labs   Lab Test 03/03/23  1312   LDL 72             Passed - No abnormal creatine kinase in past 12 months     Recent Labs   Lab Test 03/03/23  1312   CKT 80                Passed - Recent (12 mo) or future (30 days) visit within the authorizing provider's specialty     Patient has had an office visit with the authorizing provider or a provider within the authorizing providers department within the previous 12 mos or has a future within next 30 days. See \"Patient Info\" tab in inbasket, or \"Choose Columns\" in Meds & Orders section of the refill encounter.              Passed - Medication is active on med list        Passed - Patient is age 18 or older        Passed - No active pregnancy on record        Passed - No positive pregnancy test in past 12 months       " "    chlorthalidone (HYGROTON) 25 MG tablet 45 tablet 3     Sig: Take 0.5 tablets (12.5 mg) by mouth daily       Diuretics (Including Combos) Protocol Failed - 6/14/2023  1:14 PM        Failed - Blood pressure under 140/90 in past 12 months     BP Readings from Last 3 Encounters:   04/11/23 (!) 191/94   03/03/23 (!) 158/102   03/25/22 136/82                 Failed - Normal serum sodium on file in past 12 months     Recent Labs   Lab Test 03/03/23  1312   *              Passed - Recent (12 mo) or future (30 days) visit within the authorizing provider's specialty     Patient has had an office visit with the authorizing provider or a provider within the authorizing providers department within the previous 12 mos or has a future within next 30 days. See \"Patient Info\" tab in inbasket, or \"Choose Columns\" in Meds & Orders section of the refill encounter.              Passed - Medication is active on med list        Passed - Patient is age 18 or older        Passed - No active pregancy on record        Passed - Normal serum creatinine on file in past 12 months     Recent Labs   Lab Test 03/03/23  1312   CR 0.60              Passed - Normal serum potassium on file in past 12 months     Recent Labs   Lab Test 03/03/23  1312   POTASSIUM 4.1                    Passed - No positive pregnancy test in past 12 months             PAMELA KENDRICK RN 06/14/23 1:14 PM  "

## 2023-07-06 ENCOUNTER — TELEPHONE (OUTPATIENT)
Dept: INTERNAL MEDICINE | Facility: CLINIC | Age: 88
End: 2023-07-06
Payer: OTHER GOVERNMENT

## 2023-07-06 NOTE — TELEPHONE ENCOUNTER
Please call patient -    ______________________________________________________________________     Home phone:  [phone] (home)     Cell phone:   Telephone Information:   Mobile [phone]       Other contacts:  Name Home Phone Work Phone Mobile Phone Relationship Lgl Geraldine ZAMBRANOMUMTAZ CARSON   [phone] Other      ______________________________________________________________________     I received her message about her fall and her pain.    It is okay for the time being to continue to take the ibuprofen (Advil) as long as she is not having any gastrointestinal side effects like stomach pain or diarrhea.    We will see her next week.    Rufino Fenton MD  [facility]  7/6/2023, 9:53 AM

## 2023-07-13 ENCOUNTER — OFFICE VISIT (OUTPATIENT)
Dept: INTERNAL MEDICINE | Facility: CLINIC | Age: 88
End: 2023-07-13
Payer: OTHER GOVERNMENT

## 2023-07-13 VITALS
WEIGHT: 122.1 LBS | OXYGEN SATURATION: 97 % | TEMPERATURE: 97.9 F | HEIGHT: 62 IN | DIASTOLIC BLOOD PRESSURE: 84 MMHG | RESPIRATION RATE: 20 BRPM | SYSTOLIC BLOOD PRESSURE: 175 MMHG | BODY MASS INDEX: 22.47 KG/M2 | HEART RATE: 77 BPM

## 2023-07-13 DIAGNOSIS — M25.552 HIP PAIN, LEFT: Primary | ICD-10-CM
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
## 2023-07-13 DIAGNOSIS — H53.9 VISUAL DISTURBANCE: ICD-10-CM

## 2023-07-13 DIAGNOSIS — I10 PRIMARY HYPERTENSION: Chronic | ICD-10-CM

## 2023-07-13 PROCEDURE — 99214 OFFICE O/P EST MOD 30 MIN: CPT | Performed by: INTERNAL MEDICINE

## 2023-07-13 ASSESSMENT — PAIN SCALES - GENERAL: PAINLEVEL: NO PAIN (0)

## 2023-07-13 NOTE — PROGRESS NOTES
[facility] - Primary [facility] Specialists    Comprehensive and complex medical care - Chronic disease management - Shared decision making - Care coordination - Compassionate care    Patient advocacy - Rational deprescribing - Minimally disruptive medicine - Ethical focus - Customized care         Date of Service: 7/13/2023  Primary Provider: Rufino Fenton    Patient Care Team:  Rufino Fenton MD as PCP - General  Rufino Fenton MD as Assigned PCP  Shree Kruger MD as MD (Ophthalmology)  Federico Raza MD as MD (Orthopaedic Surgery)          Patient's Pharmacy:    [id number] - [place], [place] - [address]  [address]  [facility] [address]  Phone: [phone] Fax: [phone]    [facility] [place], [place] - [address]  [address]  [facility] [address]  Phone: [phone] Fax: [phone]     Patient's Contacts:  Name Home Phone Work Phone Mobile Phone Relationship Lgl MUMTAZ Duke   [phone] Other      Patient's Insurance:    Payor: [facility] / Plan: [facility] / Product Type: PPO /           Active Problem List:  Problem List as of 7/13/2023 Reviewed: 7/13/2023  8:57 PM by Rufino Fenton MD       High    Nonsmoker       Medium    HTN (hypertension)    Closed wedge compression fracture of T7 vertebra, initial encounter (H)       Low    HLD (hyperlipidemia)    OAB (overactive bladder)        Current Outpatient Medications   Medication Instructions     amLODIPine-benazepril (LOTREL) 5-20 MG capsule 1 capsule, Oral, DAILY     aspirin (ASA) 81 mg, DAILY     calcium carb/vit D3/minerals (CALCIUM-VITAMIN D ORAL) 1 tablet, 2 TIMES DAILY     chlorthalidone (HYGROTON) 12.5 mg, Oral, DAILY     loperamide (IMODIUM) 2 mg, Oral, TWICE WEEKLY     multivit-mins no.63/iron/folic (M-VIT ORAL) 1 tablet, DAILY     rosuvastatin (CRESTOR) 5 mg, Oral, FOUR TIMES WEEKLY     solifenacin (VESICARE) 10  mg, Oral, DAILY     vit C/E/zinc ox/harrison/lut/zeax (ICAPS AREDS2 ORAL) 1 tablet, 2 TIMES DAILY     Social History     Social History Narrative    .  Lives in the [facility] near [email or web address].    Son is Dr. Vikas Modi, orthopedist at [facility].    Has 2 daughters who live in the [facility] area and 1 who lives in [place].    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  Previously lived in [place] ([id number]).    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       Subjective:     Sandra Modi is a 89 year old female who comes in today for:    Chief Complaint   Patient presents with     Fall     Off a ladder on a travel van onto her butt.           7/13/2023     3:54 PM   Additional Questions   Roomed by DEYA Baptiste   Accompanied by LONA     Mainly in today to follow up a fall and injury.  Was climbing backwards out of a van and fell backwards   Fell on left butt and back and hit head minimally.    No initial pain but then on the next day.  Only to the left hip.  No bruising.    Went to [facility] urgent care and had x-ray which was negative.    Went to [facility] on a planned trip but not able to walk much.  Pain was okay with sitting and laying.  Worse with standing and walking.  Locates pain over the hamstring insertion, lateral buttock and some in the lateral upper leg.  No numbness or weakness.    Used ibuprofen (Advil) for the pain which did help and no side effects.    The fall was April 4th.      In the last 2-3 days the pain has gotten significantly better and not using the ibuprofen (Advil) at this time.    Also review her blood pressure records from home were reviewed and most readings are below 140/90.    Reviewed some visual issues she gets about 2-3 time a week at night with a [place] in her vision.  This is not associated with headaches and this occurs in both eyes.  It has occurred for 5-6 years.  She has seen [facility] for this.  No  "abnormal but maybe some early macular degeneration changes.  Usually resolves when she turns on a light.    We reviewed her other issues noted in the assessment but not specifically addressed in the HPI above.     Objective:     Wt Readings from Last 3 Encounters:   07/13/23 55.4 kg (122 lb 1.6 oz)   04/11/23 55.3 kg (122 lb)   03/03/23 55.3 kg (121 lb 14.4 oz)     BP Readings from Last 3 Encounters:   07/13/23 (!) 175/84   04/11/23 (!) 191/94   03/03/23 (!) 158/102     BP (!) 175/84 (BP Location: Right arm, Patient Position: Sitting, Cuff Size: Adult Regular)   Pulse 77   Temp 97.9  F (36.6  C) (Oral)   Resp 20   Ht 1.581 m (5' 2.25\")   Wt 55.4 kg (122 lb 1.6 oz)   LMP  (LMP Unknown)   SpO2 97%   BMI 22.15 kg/m     The patient is comfortable, no acute distress.  Mood good.  Insight good.  Eyes are nonicteric.  Neck is supple without mass.  No cervical adenopathy.  No thyromegaly. Heart regular rate and rhythm.  Lungs clear to auscultation bilaterally.  Respiratory effort is good.   Extremities no edema.  Gait is good.  Range of motion in the left hip is very good.  Some tenderness over the ischial bursa, but minimal.  No trochanteric bursitis noted.      Diagnostics:     Office Visit on 03/03/2023   Component Date Value Ref Range Status     CK 03/03/2023 80  26 - 192 U/L Final     Cholesterol 03/03/2023 174  <200 mg/dL Final     Triglycerides 03/03/2023 70  <150 mg/dL Final     Direct Measure HDL 03/03/2023 88  >=50 mg/dL Final     LDL Cholesterol Calculated 03/03/2023 72  <=100 mg/dL Final     Non HDL Cholesterol 03/03/2023 86  <130 mg/dL Final     CRP Inflammation 03/03/2023 <3.00  <5.00 mg/L Final     Erythrocyte Sedimentation Rate 03/03/2023 31 (H)  0 - 20 mm/hr Final     Sodium 03/03/2023 134 (L)  136 - 145 mmol/L Final     Potassium 03/03/2023 4.1  3.4 - 5.3 mmol/L Final     Chloride 03/03/2023 95 (L)  98 - 107 mmol/L Final     Carbon Dioxide (CO2) 03/03/2023 27  22 - 29 mmol/L Final     Anion Gap " 03/03/2023 12  7 - 15 mmol/L Final     Urea Nitrogen 03/03/2023 11.6  8.0 - 23.0 mg/dL Final     Creatinine 03/03/2023 0.60  0.51 - 0.95 mg/dL Final     Calcium 03/03/2023 9.6  8.2 - 9.6 mg/dL Final     Glucose 03/03/2023 101 (H)  70 - 99 mg/dL Final     Alkaline Phosphatase 03/03/2023 110 (H)  35 - 104 U/L Final     AST 03/03/2023 33  10 - 35 U/L Final    Specimen is hemolyzed which can falsely elevate AST. Analysis of a non-hemolyzed specimen may result in a lower value.     ALT 03/03/2023 23  10 - 35 U/L Final     Protein Total 03/03/2023 7.2  6.4 - 8.3 g/dL Final     Albumin 03/03/2023 4.3  3.5 - 5.2 g/dL Final     Bilirubin Total 03/03/2023 0.6  <=1.2 mg/dL Final     GFR Estimate 03/03/2023 84  >60 mL/min/1.73m2 Final    eGFR calculated using 2021 CKD-EPI equation.     WBC Count 03/03/2023 6.3  4.0 - 11.0 10e3/uL Final     RBC Count 03/03/2023 4.57  3.80 - 5.20 10e6/uL Final     Hemoglobin 03/03/2023 14.8  11.7 - 15.7 g/dL Final     Hematocrit 03/03/2023 43.9  35.0 - 47.0 % Final     MCV 03/03/2023 96  78 - 100 fL Final     MCH 03/03/2023 32.4  26.5 - 33.0 pg Final     MCHC 03/03/2023 33.7  31.5 - 36.5 g/dL Final     RDW 03/03/2023 12.4  10.0 - 15.0 % Final     Platelet Count 03/03/2023 273  150 - 450 10e3/uL Final     % Neutrophils 03/03/2023 69  % Final     % Lymphocytes 03/03/2023 22  % Final     % Monocytes 03/03/2023 7  % Final     % Eosinophils 03/03/2023 1  % Final     % Basophils 03/03/2023 1  % Final     % Immature Granulocytes 03/03/2023 0  % Final     Absolute Neutrophils 03/03/2023 4.4  1.6 - 8.3 10e3/uL Final     Absolute Lymphocytes 03/03/2023 1.4  0.8 - 5.3 10e3/uL Final     Absolute Monocytes 03/03/2023 0.4  0.0 - 1.3 10e3/uL Final     Absolute Eosinophils 03/03/2023 0.1  0.0 - 0.7 10e3/uL Final     Absolute Basophils 03/03/2023 0.0  0.0 - 0.2 10e3/uL Final     Absolute Immature Granulocytes 03/03/2023 0.0  <=0.4 10e3/uL Final       No results found for any visits on 07/13/23.    Assessment:      1. Hip pain, left    2. Primary hypertension    3. Fall, initial encounter    4. Visual disturbance        Plan:     1. Records from [facility] reviewed.  2. Consider MRI scan of the hip if worsens again.  3. Cannot exclude a back issue, but doubt.  4. Request records from [facility].  5. Continue current medications otherwise.  6. Follow up sooner if issues.    No orders of the defined types were placed in this encounter.          Rufino eFnton MD  General Internal Medicine  [facility]    Return in about 8 months (around 3/4/2024) for annual wellness visit.     No future appointments.    Answers for HPI/ROS submitted by the patient on 7/13/2023  How many servings of fruits and vegetables do you eat daily?: 2-3  On average, how many sweetened beverages do you drink each day (Examples: soda, juice, sweet tea, etc.  Do NOT count diet or artificially sweetened beverages)?: 0  How many minutes a day do you exercise enough to make your heart beat faster?: 9 or less  How many days a week do you exercise enough to make your heart beat faster?: 3 or less  How many days per week do you miss taking your medication?: 0  What is the reason for your visit today?: fell  When did your symptoms begin?: 3-4 weeks ago  How would you describe these symptoms?: Moderate  Are your symptoms:: Improving  Have you had these symptoms before?: No  Is there anything that makes you feel worse?: cooking dishwashing anything standing  Is there anything that makes you feel better?: sitting lying down ibuprofen

## 2023-07-14 NOTE — PATIENT INSTRUCTIONS
Preventative Measures:  Health Maintenance   Topic Date Due    ANNUAL REVIEW OF HM ORDERS  Never done    COVID-19 Vaccine (6 - Moderna series) 02/28/2023    INFLUENZA VACCINE (1) 09/01/2023    MEDICARE ANNUAL WELLNESS VISIT  03/03/2024    FALL RISK ASSESSMENT  03/03/2024    ADVANCE CARE PLANNING  03/07/2028    DTAP/TDAP/TD IMMUNIZATION (2 - Td or Tdap) 03/04/2031    PHQ-2 (once per calendar year)  Completed    ZOSTER IMMUNIZATION  Completed    IPV IMMUNIZATION  Aged Out    MENINGITIS IMMUNIZATION  Aged Out    Pneumococcal Vaccine: 65+ Years  Discontinued

## 2023-08-14 ENCOUNTER — NURSE TRIAGE (OUTPATIENT)
Dept: NURSING | Facility: CLINIC | Age: 88
End: 2023-08-14
Payer: OTHER GOVERNMENT

## 2023-08-14 NOTE — TELEPHONE ENCOUNTER
Nurse Triage SBAR    Is this a 2nd Level Triage? NO    Situation: Pt requests referral sent to dermatology due to a skin lesion on her face    Background:   Skin lesion started 15 days ago and is growing fast.    States that she had a skin concern years ago and seen derm the same day, and had immediate surgery. Pt does not know the details of her diagnosis but states it was skin cancer.    Assessment:   Flat and crusty lesion right below her nose  Started out as a small pimple which has not grown into 1/4 x 1/2 inch lesion  No drainage  No fever  No redness  No pain    Protocol Recommended Disposition:   See in Office Within 2 Weeks, See in Office Within 3 Days    Recommendation:   Pt requests derm referral    Routed to provider  Please call pt [phone]    Does the patient meet one of the following criteria for ADS visit consideration? 16+ years old, with an [facility] PCP     TIP  Providers, please consider if this condition is appropriate for management at one of our Acute and Diagnostic Services sites.     If patient is a good candidate, please use dotphrase <dot>triageresponse and select Refer to ADS to document.    Ana Dee RN/[place] Nurse Advisor      Reason for Disposition   Patient wants to be seen   Sticks up out of the skin (elevated), and feels rough to the touch    Additional Information   Negative: Patient sounds very sick or weak to the triager   Negative: Fever and bump is tender to touch   Negative: Looks infected (e.g., spreading redness, red streak, pus)   Negative: Looks like a boil, infected sore, or deep ulcer   Negative: Caller can't describe it clearly   Negative: Skin growth or mole and two sides do not look the same (it is asymmetric)   Negative: Skin growth or mole and border is irregular or blurry   Negative: Skin growth or mole and changes color or has more than one color   Negative: Skin growth or mole and it is larger than a pencil eraser or increasing in size   Negative: Skin  growth or mole and bleeds    Protocols used: Skin Lesion - Moles or Growths-A-OH

## 2023-08-14 NOTE — TELEPHONE ENCOUNTER
Contacted patient and reviewed PCP message below.  Patient wrote down contact info and will call right away, she wants to be seen asap so whoever can see her first is her preference.

## 2023-08-14 NOTE — TELEPHONE ENCOUNTER
To schedule an appointment with a dermatologist, I recommend calling [facility] at  [phone].    They have multiple offices in the following locations:    [address], [address]  [place] [address]    [address], [address]  [place] [address]    Please have them send me copies of your reports from your visit.     If she would like to stay in [place] for dermatology, then could go to [facility] there.    Rufino Fenton MD  General Internal Medicine  [facility]  8/14/2023, 10:19 AM         above

## 2023-10-16 ENCOUNTER — TELEPHONE (OUTPATIENT)
Dept: INTERNAL MEDICINE | Facility: CLINIC | Age: 88
End: 2023-10-16
Payer: OTHER GOVERNMENT

## 2023-10-16 NOTE — TELEPHONE ENCOUNTER
Patient calling to state she has a funny spot on the side of her nose for weeks  scabs over and peels over    spot on the face looks wet for about 2 days then scabs over and repeats.    it never clears up    wants a recommendation on what to do , drew jaramillo cancer    Call Back   Contact Information  [phone] (Home Phone)

## 2023-10-17 NOTE — TELEPHONE ENCOUNTER
[facility] for [facility].  They are in [place] and are a very good group.    If she would like to be seen in [place], then [facility] is an option or Dr. Lindsey Luna, who is in private practice.    Rufino Fenton MD  General Internal Medicine  [facility]  10/17/2023, 9:01 AM

## 2023-12-13 ENCOUNTER — TELEPHONE (OUTPATIENT)
Dept: INTERNAL MEDICINE | Facility: CLINIC | Age: 88
End: 2023-12-13
Payer: OTHER GOVERNMENT

## 2023-12-13 NOTE — TELEPHONE ENCOUNTER
Reason for Call:  Other call back    Detailed comments: will be having spot on nose removed on 12/19/23.  Per patient she was told to stop taking blood thinner medication, in her case asiprin  prior to procedure.  Wants to verify with Dr. Fenton if he agrees.    In addition would like to inform Dr. Fenton that she had covid vaccine booster on 11/16/23 at the [facility] in [place]     Phone Number Patient can be reached at: Home number on file [phone] (home)    Best Time: any    Can we leave a detailed message on this number? YES    Call taken on 12/13/2023 at 4:19 PM by Selena Anderson

## 2023-12-29 DIAGNOSIS — I10 ESSENTIAL HYPERTENSION: ICD-10-CM

## 2023-12-29 RX ORDER — CHLORTHALIDONE 25 MG/1
12.5 TABLET ORAL DAILY
Qty: 45 TABLET | Refills: 3 | Status: SHIPPED | OUTPATIENT
Start: 2023-12-29

## 2024-02-06 ENCOUNTER — OFFICE VISIT (OUTPATIENT)
Dept: INTERNAL MEDICINE | Facility: CLINIC | Age: 89
End: 2024-02-06
Payer: OTHER GOVERNMENT

## 2024-02-06 VITALS
RESPIRATION RATE: 18 BRPM | HEIGHT: 62 IN | HEART RATE: 66 BPM | SYSTOLIC BLOOD PRESSURE: 144 MMHG | BODY MASS INDEX: 22.63 KG/M2 | DIASTOLIC BLOOD PRESSURE: 80 MMHG | OXYGEN SATURATION: 97 % | WEIGHT: 123 LBS

## 2024-02-06 DIAGNOSIS — M25.461 EFFUSION OF RIGHT KNEE: ICD-10-CM

## 2024-02-06 DIAGNOSIS — M25.561 ACUTE PAIN OF RIGHT KNEE: Primary | ICD-10-CM

## 2024-02-06 DIAGNOSIS — M25.551 HIP PAIN, RIGHT: ICD-10-CM

## 2024-02-06 DIAGNOSIS — I10 PRIMARY HYPERTENSION: Chronic | ICD-10-CM

## 2024-02-06 DIAGNOSIS — M79.89 RIGHT LEG SWELLING: ICD-10-CM

## 2024-02-06 PROBLEM — E78.2 MIXED HYPERLIPIDEMIA: Status: ACTIVE | Noted: 2020-03-24

## 2024-02-06 PROBLEM — E78.5 HLD (HYPERLIPIDEMIA): Status: ACTIVE | Noted: 2020-03-24

## 2024-02-06 PROBLEM — E78.5 HLD (HYPERLIPIDEMIA): Status: RESOLVED | Noted: 2020-03-24 | Resolved: 2024-02-06

## 2024-02-06 PROCEDURE — 99214 OFFICE O/P EST MOD 30 MIN: CPT | Performed by: INTERNAL MEDICINE

## 2024-02-06 NOTE — PROGRESS NOTES
[facility] - Primary [facility] Specialists    Comprehensive and complex medical care - Chronic disease management - Shared decision making - Care coordination - Compassionate care    Patient advocacy - Rational deprescribing - Minimally disruptive medicine - Ethical focus - Customized care         Date of Service: 2/6/2024  Primary Provider: Rufino Fenton    Patient Care Team:  Rufino Fenton MD as PCP - General  Rufino Fenton MD as Assigned PCP  Shree Kruger MD as MD (Ophthalmology)  Federico Raza MD as MD (Orthopaedic Surgery)          Patient's Pharmacy:    [id number] - [place], [place] - [address]  [address]  [facility] [address]  Phone: [phone] Fax: [phone]    [facility] [place], [place] - [address]  [address]  [facility] [address]  Phone: [phone] Fax: [phone]     Patient's Contacts:  Name Home Phone Work Phone Mobile Phone Relationship Lgl MUMTAZ Duke   [phone] Other      Patient's Insurance:    Payor: [facility] / Plan: [facility] / Product Type: PPO /           Current Outpatient Medications   Medication Instructions    amLODIPine-benazepril (LOTREL) 5-20 MG capsule 1 capsule, Oral, DAILY    aspirin (ASA) 81 mg, DAILY    calcium carb/vit D3/minerals (CALCIUM-VITAMIN D ORAL) 1 tablet, 2 TIMES DAILY    chlorthalidone (HYGROTON) 12.5 mg, Oral, DAILY    loperamide (IMODIUM) 2 mg, Oral, TWICE WEEKLY    multivit-mins no.63/iron/folic (M-VIT ORAL) 1 tablet, DAILY    rosuvastatin (CRESTOR) 5 mg, Oral, FOUR TIMES WEEKLY    solifenacin (VESICARE) 10 mg, Oral, DAILY    vit C/E/zinc ox/harrison/lut/zeax (ICAPS AREDS2 ORAL) 1 tablet, 2 TIMES DAILY        Subjective:      Sandra Modi is a 89 year old female who comes in today for:    Chief Complaint   Patient presents with    Knee Pain     Pt states startin a week ago, middle of the night I get this sharp pain  "in the right knee that wake me up. Only get worse when I changing position, seating in one position make it better. But now is much better           2/6/2024     7:46 AM   Additional Questions   Roomed by Dani maria c     Comes in with her son Vikas who is a retired orthopedist from [employer].    Started to have right knee pain about 1 week ago.  Woke up with sharp pain in this area and has noted some swelling a warmth to the knee.  She was limping with this.    She also noted some right lateral hip pain at the same time.  She really noted both pains with changing position.  Has had some scattered and intermittent other pains in the leg but no numbness or burning.  No weakness.    Some swelling in the right lower extremity with this but this is improved.    Did have to use a cane for a short while.    No shortness of breath.    Did use some acetaminophen (Tylenol) for this as well.    Objective:     Wt Readings from Last 3 Encounters:   02/06/24 55.8 kg (123 lb)   07/13/23 55.4 kg (122 lb 1.6 oz)   04/11/23 55.3 kg (122 lb)     BP Readings from Last 3 Encounters:   02/06/24 (!) 144/80   07/13/23 (!) 175/84   04/11/23 (!) 191/94      BP (!) 144/80 (BP Location: Right arm, Patient Position: Sitting, Cuff Size: Adult Regular)   Pulse 66   Resp 18   Ht 1.581 m (5' 2.25\")   Wt 55.8 kg (123 lb)   LMP  (LMP Unknown)   SpO2 97%   BMI 22.32 kg/m     In general, the patient is comfortable, no apparent distress.  Mood good.  Insight good.  Heart regular rate and rhythm.  Lungs clear to ausculation bilaterally.  Right hip shows normal passive range of motion (PROM) and negative David's maneuver.  There is no trochanteric bursitis appreciated on today's exam.  Right knee shows a mild effusion with some mild warmth.  There is trace lower extremity edema and no discoloration.  Gait is good today.    Diagnostics:     No results found for any visits on 02/06/24.     Assessment:     1. Acute pain of right knee    2. " Hip pain, right    3. Right leg swelling    4. Effusion of right knee    5. Primary hypertension        Plan:     Discussed x-rays of the knee, hip and ultrasound of the leg today.  They wish to follow at this time.  Could do physical therapy in the future if needed.  Wonder about pseudogout as a cause for the knee.  Could go to [facility] if symptoms worsen again in the meantime.    No orders of the defined types were placed in this encounter.      Rufino Fenton MD  General Internal Medicine  [facility]    Return in about 6 weeks (around 3/22/2024) for annual wellness visit.     Future Appointments   Date Time Provider Department Center   3/22/2024 10:30 AM Rufino Fenton MD [facility]

## 2024-02-06 NOTE — PATIENT INSTRUCTIONS
Preventative Measures:  Health Maintenance   Topic Date Due    ANNUAL REVIEW OF HM ORDERS  Never done    INFLUENZA VACCINE (1) Never done    MEDICARE ANNUAL WELLNESS VISIT  03/03/2024    RSV VACCINE (Pregnancy & 60+) (1 - 1-dose 60+ series) 12/13/2024 (Originally 8/11/1991)    FALL RISK ASSESSMENT  02/06/2025    LIPID  03/03/2026    ADVANCE CARE PLANNING  03/07/2028    DTAP/TDAP/TD IMMUNIZATION (2 - Td or Tdap) 03/04/2031    PHQ-2 (once per calendar year)  Completed    ZOSTER IMMUNIZATION  Completed    COVID-19 Vaccine  Completed    IPV IMMUNIZATION  Aged Out    HPV IMMUNIZATION  Aged Out    MENINGITIS IMMUNIZATION  Aged Out    RSV MONOCLONAL ANTIBODY  Aged Out    Pneumococcal Vaccine: 65+ Years  Discontinued      Future Appointments   Date Time Provider Department Center   3/22/2024 10:30 AM Rufino Fenton MD [facility]

## 2024-03-22 ENCOUNTER — OFFICE VISIT (OUTPATIENT)
Dept: INTERNAL MEDICINE | Facility: CLINIC | Age: 89
End: 2024-03-22
Payer: OTHER GOVERNMENT

## 2024-03-22 VITALS
OXYGEN SATURATION: 94 % | RESPIRATION RATE: 18 BRPM | DIASTOLIC BLOOD PRESSURE: 68 MMHG | BODY MASS INDEX: 22.08 KG/M2 | WEIGHT: 120 LBS | HEIGHT: 62 IN | TEMPERATURE: 97.5 F | SYSTOLIC BLOOD PRESSURE: 134 MMHG | HEART RATE: 77 BPM

## 2024-03-22 DIAGNOSIS — N32.81 OAB (OVERACTIVE BLADDER): ICD-10-CM

## 2024-03-22 DIAGNOSIS — I10 PRIMARY HYPERTENSION: Chronic | ICD-10-CM

## 2024-03-22 DIAGNOSIS — S22.060A CLOSED WEDGE COMPRESSION FRACTURE OF T7 VERTEBRA, INITIAL ENCOUNTER (H): ICD-10-CM

## 2024-03-22 DIAGNOSIS — Z00.00 MEDICARE ANNUAL WELLNESS VISIT, SUBSEQUENT: Primary | ICD-10-CM

## 2024-03-22 DIAGNOSIS — R26.89 BALANCE DISORDER: ICD-10-CM

## 2024-03-22 DIAGNOSIS — E78.2 MIXED HYPERLIPIDEMIA: ICD-10-CM

## 2024-03-22 LAB
ALBUMIN SERPL BCG-MCNC: 4.3 G/DL (ref 3.5–5.2)
ALP SERPL-CCNC: 104 U/L (ref 40–150)
ALT SERPL W P-5'-P-CCNC: 18 U/L (ref 0–50)
ANION GAP SERPL CALCULATED.3IONS-SCNC: 11 MMOL/L (ref 7–15)
AST SERPL W P-5'-P-CCNC: 28 U/L (ref 0–45)
BILIRUB DIRECT SERPL-MCNC: <0.2 MG/DL (ref 0–0.3)
BILIRUB SERPL-MCNC: 0.6 MG/DL
BUN SERPL-MCNC: 12.1 MG/DL (ref 8–23)
CALCIUM SERPL-MCNC: 9.5 MG/DL (ref 8.2–9.6)
CHLORIDE SERPL-SCNC: 94 MMOL/L (ref 98–107)
CHOLEST SERPL-MCNC: 175 MG/DL
CREAT SERPL-MCNC: 0.68 MG/DL (ref 0.51–0.95)
DEPRECATED HCO3 PLAS-SCNC: 29 MMOL/L (ref 22–29)
EGFRCR SERPLBLD CKD-EPI 2021: 81 ML/MIN/1.73M2
ERYTHROCYTE [DISTWIDTH] IN BLOOD BY AUTOMATED COUNT: 12.2 % (ref 10–15)
FASTING STATUS PATIENT QL REPORTED: NORMAL
GLUCOSE SERPL-MCNC: 98 MG/DL (ref 70–99)
HCT VFR BLD AUTO: 42.6 % (ref 35–47)
HDLC SERPL-MCNC: 84 MG/DL
HGB BLD-MCNC: 14.4 G/DL (ref 11.7–15.7)
LDLC SERPL CALC-MCNC: 77 MG/DL
MCH RBC QN AUTO: 32.6 PG (ref 26.5–33)
MCHC RBC AUTO-ENTMCNC: 33.8 G/DL (ref 31.5–36.5)
MCV RBC AUTO: 96 FL (ref 78–100)
NONHDLC SERPL-MCNC: 91 MG/DL
PLATELET # BLD AUTO: 266 10E3/UL (ref 150–450)
POTASSIUM SERPL-SCNC: 3.6 MMOL/L (ref 3.4–5.3)
PROT SERPL-MCNC: 7.3 G/DL (ref 6.4–8.3)
RBC # BLD AUTO: 4.42 10E6/UL (ref 3.8–5.2)
SODIUM SERPL-SCNC: 134 MMOL/L (ref 135–145)
TRIGL SERPL-MCNC: 68 MG/DL
VIT B12 SERPL-MCNC: 1056 PG/ML (ref 232–1245)
WBC # BLD AUTO: 5.7 10E3/UL (ref 4–11)

## 2024-03-22 PROCEDURE — 85027 COMPLETE CBC AUTOMATED: CPT | Performed by: INTERNAL MEDICINE

## 2024-03-22 PROCEDURE — 80053 COMPREHEN METABOLIC PANEL: CPT | Performed by: INTERNAL MEDICINE

## 2024-03-22 PROCEDURE — 99397 PER PM REEVAL EST PAT 65+ YR: CPT | Performed by: INTERNAL MEDICINE

## 2024-03-22 PROCEDURE — 82248 BILIRUBIN DIRECT: CPT | Performed by: INTERNAL MEDICINE

## 2024-03-22 PROCEDURE — 36415 COLL VENOUS BLD VENIPUNCTURE: CPT | Performed by: INTERNAL MEDICINE

## 2024-03-22 PROCEDURE — 80061 LIPID PANEL: CPT | Performed by: INTERNAL MEDICINE

## 2024-03-22 PROCEDURE — 99214 OFFICE O/P EST MOD 30 MIN: CPT | Mod: 25 | Performed by: INTERNAL MEDICINE

## 2024-03-22 PROCEDURE — 82607 VITAMIN B-12: CPT | Performed by: INTERNAL MEDICINE

## 2024-03-22 ASSESSMENT — SOCIAL DETERMINANTS OF HEALTH (SDOH): HOW OFTEN DO YOU GET TOGETHER WITH FRIENDS OR RELATIVES?: TWICE A WEEK

## 2024-03-22 NOTE — PATIENT INSTRUCTIONS
Learning About Activities of Daily Living  What are activities of daily living?     Activities of daily living (ADLs) are the basic self-care tasks you do every day. These include eating, bathing, dressing, and moving around.  As you age, and if you have health problems, you may find that it's harder to do some of these tasks. If so, your doctor can suggest ideas that may help.  To measure what kind of help you may need, your doctor will ask how well you are able to do ADLs. Let your doctor know if there are any tasks that you are having trouble doing. This is an important first step to getting help. And when you have the help you need, you can stay as independent as possible.  How will a doctor assess your ADLs?  Asking about ADLs is part of a routine health checkup your doctor will likely do as you age. Your health check might be done in a doctor's office, in your home, or at a hospital. The goal is to find out if you are having any problems that could make it hard to care for yourself or that make it unsafe for you to be on your own.  To measure your ADLs, your doctor will ask how hard it is for you to do routine tasks. Your doctor may also want to know if you have changed the way you do a task because of a health problem. Your doctor may watch how you:  Walk back and forth.  Keep your balance while you stand or walk.  Move from sitting to standing or from a bed to a chair.  Button or unbutton a shirt or sweater.  Remove and put on your shoes.  It's common to feel a little worried or anxious if you find you can't do all the things you used to be able to do. Talking with your doctor about ADLs is a way to make sure you're as safe as possible and able to care for yourself as well as you can. You may want to bring a caregiver, friend, or family member to your checkup. They can help you talk to your doctor.  Follow-up care is a key part of your treatment and safety. Be sure to make and go to all appointments, and  call your doctor if you are having problems. It's also a good idea to know your test results and keep a list of the medicines you take.  Current as of: October 24, 2023               Content Version: 14.0    1658-1333 [employer].   Care instructions adapted under license by your healthcare professional. If you have questions about a medical condition or this instruction, always ask your healthcare professional. [employer] disclaims any warranty or liability for your use of this information.      Hearing Loss: Care Instructions  Overview     Hearing loss is a sudden or slow decrease in how well you hear. It can range from slight to profound. Permanent hearing loss can occur with aging. It also can happen when you are exposed long-term to loud noise. Examples include listening to loud music, riding motorcycles, or being around other loud machines.  Hearing loss can affect your work and home life. It can make you feel lonely or depressed. You may feel that you have lost your independence. But hearing aids and other devices can help you hear better and feel connected to others.  Follow-up care is a key part of your treatment and safety. Be sure to make and go to all appointments, and call your doctor if you are having problems. It's also a good idea to know your test results and keep a list of the medicines you take.  How can you care for yourself at home?  Avoid loud noises whenever possible. This helps keep your hearing from getting worse.  Always wear hearing protection around loud noises.  Wear a hearing aid as directed.  A professional can help you pick a hearing aid that will work best for you.  You can also get hearing aids over the counter for mild to moderate hearing loss.  Have hearing tests as your doctor suggests. They can show whether your hearing has changed. Your hearing aid may need to be adjusted.  Use other devices as needed. These may include:  Telephone amplifiers and  "hearing aids that can connect to a television, stereo, radio, or microphone.  Devices that use lights or vibrations. These alert you to the doorbell, a ringing telephone, or a baby monitor.  Television closed-captioning. This shows the words at the bottom of the screen. Most new TVs can do this.  TTY (text telephone). This lets you type messages back and forth on the telephone instead of talking or listening. These devices are also called TDD. When messages are typed on the keyboard, they are sent over the phone line to a receiving TTY. The message is shown on a monitor.  Use text messaging, social media, and email if it is hard for you to communicate by telephone.  Try to learn a listening technique called speechreading. It is not lipreading. You pay attention to people's gestures, expressions, posture, and tone of voice. These clues can help you understand what a person is saying. Face the person you are talking to, and have them face you. Make sure the lighting is good. You need to see the other person's face clearly.  Think about counseling if you need help to adjust to your hearing loss.  When should you call for help?  Watch closely for changes in your health, and be sure to contact your doctor if:    You think your hearing is getting worse.     You have new symptoms, such as dizziness or nausea.   Where can you learn more?  Go to https://www.[employer].net/patiented  Enter R798 in the search box to learn more about \"Hearing Loss: Care Instructions.\"  Current as of: September 27, 2023               Content Version: 14.0    0847-8813 [email or web address].   Care instructions adapted under license by your healthcare professional. If you have questions about a medical condition or this instruction, always ask your healthcare professional. [email or web address] disclaims any warranty or liability for your use of this information.      Bladder Training: Care Instructions  Your Care Instructions     Bladder " training is used to treat urge incontinence and stress incontinence. Urge incontinence means that the need to urinate comes on so fast that you can't get to a toilet in time. Stress incontinence means that you leak urine because of pressure on your bladder. For example, it may happen when you laugh, cough, or lift something heavy.  Bladder training can increase how long you can wait before you have to urinate. It can also help your bladder hold more urine. And it can give you better control over the urge to urinate.  It is important to remember that bladder training takes a few weeks to a few months to make a difference. You may not see results right away, but don't give up.  Follow-up care is a key part of your treatment and safety. Be sure to make and go to all appointments, and call your doctor if you are having problems. It's also a good idea to know your test results and keep a list of the medicines you take.  How can you care for yourself at home?  Work with your doctor to come up with a bladder training program that is right for you. You may use one or more of the following methods.  Delayed urination  In the beginning, try to keep from urinating for 5 minutes after you first feel the need to go.  While you wait, take deep, slow breaths to relax. Kegel exercises can also help you delay the need to go to the bathroom.  After some practice, when you can easily wait 5 minutes to urinate, try to wait 10 minutes before you urinate.  Slowly increase the waiting period until you are able to control when you have to urinate.  Scheduled urination  Empty your bladder when you first wake up in the morning.  Schedule times throughout the day when you will urinate.  Start by going to the bathroom every hour, even if you don't need to go.  Slowly increase the time between trips to the bathroom.  When you have found a schedule that works well for you, keep doing it.  If you wake up during the night and have to urinate, do  "it. Apply your schedule to waking hours only.  Kegel exercises  These tighten and strengthen pelvic muscles, which can help you control the flow of urine. (If doing these exercises causes pain, stop doing them and talk with your doctor.) To do Kegel exercises:  Squeeze your muscles as if you were trying not to pass gas. Or squeeze your muscles as if you were stopping the flow of urine. Your belly, legs, and buttocks shouldn't move.  Hold the squeeze for 3 seconds, then relax for 5 to 10 seconds.  Start with 3 seconds, then add 1 second each week until you are able to squeeze for 10 seconds.  Repeat the exercise 10 times a session. Do 3 to 8 sessions a day.  When should you call for help?  Watch closely for changes in your health, and be sure to contact your doctor if:    Your incontinence is getting worse.     You do not get better as expected.   Where can you learn more?  Go to https://www.[employer].net/patiented  Enter V684 in the search box to learn more about \"Bladder Training: Care Instructions.\"  Current as of: November 15, 2023               Content Version: 14.0    5778-8038 [email or web address].   Care instructions adapted under license by your healthcare professional. If you have questions about a medical condition or this instruction, always ask your healthcare professional. [email or web address] disclaims any warranty or liability for your use of this information.      Learning About Alcohol Use Disorder  What is alcohol use disorder?  Alcohol use disorder means that a person drinks alcohol even though it causes harm to themselves or others. It can range from mild to severe. The more symptoms of this disorder you have, the more severe it may be. People who have it may find it hard to control their use of alcohol.  People who have this disorder may argue with others about how much they're drinking. Their job may be affected because of drinking. They may drink when it's dangerous or illegal, such " as when they drive. They also may have a strong need, or craving, to drink. They may feel like they must drink just to get by. Their drinking may increase their risk of getting hurt or being in a car crash.  Over time, drinking too much alcohol may cause health problems. These may include high blood pressure, liver problems, or problems with digestion.  What are the symptoms?  Maybe you've wondered about your alcohol habits or how to tell if your drinking is becoming a problem.  Here are some of the symptoms of alcohol use disorder. You may have it if you have two or more of the following symptoms:  You drink larger amounts of alcohol than you ever meant to. Or you've been drinking for a longer time than you ever meant to.  You can't cut down or control your use. Or you constantly wish you could cut down.  You spend a lot of time getting or drinking alcohol or recovering from its effects.  You have strong cravings for alcohol.  You can no longer do your main jobs at work, at school, or at home.  You keep drinking alcohol, even though your use hurts your relationships.  You have stopped doing important activities because of your alcohol use.  You drink alcohol in situations where doing so is dangerous.  You keep drinking alcohol even though you know it's causing health problems.  You need more and more alcohol to get the same effect, or you get less effect from the same amount over time. This is called tolerance.  You have uncomfortable symptoms when you stop drinking alcohol or use less. This is called withdrawal.  Alcohol use disorder can range from mild to severe. The more symptoms you have, the more severe the disorder may be.  You might not realize that your drinking is a problem. You might not drink large amounts when you drink. Or you might go for days or weeks between drinking episodes. But even if you don't drink very often, your drinking could still be harmful and put you at risk.  How is alcohol use  "disorder treated?  Getting help is up to you. But you don't have to do it alone. There are many people and kinds of treatments that can help.  Treatment for alcohol use disorder can include:  Group therapy, one or more types of counseling, and alcohol education.  Medicines that help to:  Reduce withdrawal symptoms and help you safely stop drinking.  Reduce cravings for alcohol.  Support groups. These groups include Alcoholics Anonymous and [employer] (Self-Management and Recovery Training).  Some people are able to stop or cut back on drinking with help from a counselor. People who have moderate to severe alcohol use disorder may need medical treatment. They may need to stay in a hospital or treatment center.  You may have a treatment team to help you. This team may include a psychologist or psychiatrist, counselors, doctors, social workers, nurses, and a . A  helps plan and manage your treatment.  Follow-up care is a key part of your treatment and safety. Be sure to make and go to all appointments, and call your doctor if you are having problems. It's also a good idea to know your test results and keep a list of the medicines you take.  Where can you learn more?  Go to https://www.[employer].net/patiented  Enter H758 in the search box to learn more about \"Learning About Alcohol Use Disorder.\"  Current as of: November 15, 2023               Content Version: 14.0    2770-5455 [employer].   Care instructions adapted under license by your healthcare professional. If you have questions about a medical condition or this instruction, always ask your healthcare professional. [employer] disclaims any warranty or liability for your use of this information.      Substance Use Disorder: Care Instructions  Overview     You can improve your life and health by stopping your use of alcohol or drugs. When you don't drink or use drugs, you may feel and sleep better. You may get " along better with your family, friends, and coworkers. There are medicines and programs that can help with substance use disorder.  How can you care for yourself at home?  Here are some ways to help you stay sober and prevent relapse.  If you have been given medicine to help keep you sober or reduce your cravings, be sure to take it exactly as prescribed.  Talk to your doctor about programs that can help you stop using drugs or drinking alcohol.  Do not keep alcohol or drugs in your home.  Plan ahead. Think about what you'll say if other people ask you to drink or use drugs. Try not to spend time with people who drink or use drugs.  Use the time and money spent on drinking or drugs to do something that's important to you.  Preventing a relapse  Have a plan to deal with relapse. Learn to recognize changes in your thinking that lead you to drink or use drugs. Get help before you start to drink or use drugs again.  Try to stay away from situations, friends, or places that may lead you to drink or use drugs.  If you feel the need to drink alcohol or use drugs again, seek help right away. Call a trusted friend or family member. Some people get support from organizations such as Narcotics Anonymous or [employer] or from treatment facilities.  If you relapse, get help as soon as you can. Some people make a plan with another person that outlines what they want that person to do for them if they relapse. The plan usually includes how to handle the relapse and who to notify in case of relapse.  Don't give up. Remember that a relapse doesn't mean that you have failed. Use the experience to learn the triggers that lead you to drink or use drugs. Then quit again. Recovery is a lifelong process. Many people have several relapses before they are able to quit for good.  Follow-up care is a key part of your treatment and safety. Be sure to make and go to all appointments, and call your doctor if you are having problems. It's  "also a good idea to know your test results and keep a list of the medicines you take.  When should you call for help?   Call 911  anytime you think you may need emergency care. For example, call if you or someone else:    Has overdosed or has withdrawal signs. Be sure to tell the emergency workers that you are or someone else is using or trying to quit using drugs. Overdose or withdrawal signs may include:  Losing consciousness.  Seizure.  Seeing or hearing things that aren't there (hallucinations).     Is thinking or talking about suicide or harming others.   Where to get help 24 hours a day, 7 days a week   If you or someone you know talks about suicide, self-harm, a mental health crisis, a substance use crisis, or any other kind of emotional distress, get help right away. You can:    Call the Suicide and Crisis Lifeline at 988.     Call 2-292-336-TALK (1-985.637.8902).     Text HOME to 923826 to access the Crisis Text Line.   Consider saving these numbers in your phone.  Go to "[employer] for more information or to chat online.  Call your doctor now or seek immediate medical care if:    You are having withdrawal symptoms. These may include nausea or vomiting, sweating, shakiness, and anxiety.   Watch closely for changes in your health, and be sure to contact your doctor if:    You have a relapse.     You need more help or support to stop.   Where can you learn more?  Go to https://www.[employer].net/patiented  Enter H573 in the search box to learn more about \"Substance Use Disorder: Care Instructions.\"  Current as of: November 15, 2023               Content Version: 14.0    4394-4618 [employer].   Care instructions adapted under license by your healthcare professional. If you have questions about a medical condition or this instruction, always ask your healthcare professional. [employer] disclaims any warranty or liability for your use of this information.      "

## 2024-03-22 NOTE — PROGRESS NOTES
[facility]    Comprehensive and complex medical care - Chronic disease management - Shared decision making - Care coordination - Compassionate care    Patient advocacy - Rational deprescribing - Minimally disruptive medicine - Ethical focus - Customized care         Date of Service: 3/22/2024  Primary Provider: Rufino Fenton    Patient Care Team:  Rufino Fenton MD as PCP - General  Rufino Fenton MD as Assigned PCP  Shree Kruger MD as MD (Ophthalmology)  Federico Raza MD as MD (Orthopaedic Surgery)  Juan Jose Stark MD as MD (Dermatology)          Patient's Pharmacy:    [id number] - [place], [place] - [address] AT [facility] [address]  [address]  [facility] [address]  Phone: [phone] Fax: [phone]    [facility] [place], [place] - [address]  [address]  [facility] [address]  Phone: [phone] Fax: [phone]     Patient's Contacts:  Name Home Phone Work Phone Mobile Phone Relationship Lgl MUMTAZ Duke   [phone] Other      Patient's Insurance:    Payor: [facility] / Plan: [facility] / Product Type: PPO /      Subjective:     History of present illness:    Sandra Modi is an 89 year old here for an annual wellness visit.    The issues she would like to address at today's visit include the following:    Chief Complaint   Patient presents with    Physical     AWV           3/22/2024    10:19 AM   Additional Questions   Roomed by Dani Espinosa   Accompanied by N/A     Patient comes in today for annual wellness visit and for other issues.    We reviewed her high blood pressure.  She is on 2 pills for her blood pressure.  She wonders if she still needs both.  Her blood pressure is doing well today.  She denies any dizziness.  We reviewed the benefits of staying on it versus going off.    We reviewed her cholesterol and issues with this.  She is okay with  continuing on cholesterol medication at this time.    Her urine incontinence is been a little bit more.  She uses a pad.  She is okay with continuing the Solifenacin at this time.    She does have issues still with intermittent knee pain and hip pain but not enough to do anything more with at this time.    She has noticed a gradual loss of balance over time and uses a walking stick for when she goes out hiking.    She notes intermittent constipation but not regularly.    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  She recently had a seborrheic keratosis removed as well on her right armpit area.    We reviewed her other issues noted in the assessment but not specifically addressed in the HPI above.           Active Problem List:  Problem List as of 3/22/2024 Reviewed: 3/22/2024 10:35 AM by Rufino Fenton MD         High    Nonsmoker       Medium    Primary hypertension    Closed wedge compression fracture of T7 vertebra, initial encounter (H)       Low    Mixed hyperlipidemia    OAB (overactive bladder)        Past Medical History:   Diagnosis Date    HLD (hyperlipidemia) 3/24/2020    HTN (hypertension)     Nonsmoker       Past Surgical History:   Procedure Laterality Date    CATARACT EXTRACTION, BILATERAL  01/01/2020    RELEASE CARPAL TUNNEL Left 2022    TONSILLECTOMY  01/01/1937    WRIST FRACTURE SURGERY Right 01/01/2001      Family History   Problem Relation Age of Onset    Transient ischemic attack Mother     Hypertension Mother     Alzheimer Disease Father     Colon Cancer Father     Kidney Cancer Sister     No Known Problems Daughter     No Known Problems Daughter     No Known Problems Son       Family history is otherwise noncontributory.    Social History     Occupational History    Not on file   Tobacco Use    Smoking status: Never    Smokeless tobacco: Never   Substance and Sexual Activity    Alcohol use: Not Currently    Drug use: Not on file     "Sexual activity: Not on file      Social History     Social History Narrative    .  Lives in the [facility] [place].    Son is Dr. Vikas Modi, orthopedist at [employer].    Has 2 daughters who live in the [facility] area and 1 who lives in [place].    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  Previously lived in [place] ([id number]).    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      Current Outpatient Medications   Medication Instructions    amLODIPine-benazepril (LOTREL) 5-20 MG capsule 1 capsule, Oral, DAILY    aspirin (ASA) 81 mg, DAILY    calcium carb/vit D3/minerals (CALCIUM-VITAMIN D ORAL) 1 tablet, 2 TIMES DAILY    chlorthalidone (HYGROTON) 12.5 mg, Oral, DAILY    loperamide (IMODIUM) 2 mg, Oral, TWICE WEEKLY    multivit-mins no.63/iron/folic (M-VIT ORAL) 1 tablet, DAILY    rosuvastatin (CRESTOR) 5 mg, Oral, FOUR TIMES WEEKLY    solifenacin (VESICARE) 10 mg, Oral, DAILY    vit C/E/zinc ox/harrison/lut/zeax (ICAPS AREDS2 ORAL) 1 tablet, 2 TIMES DAILY      Allergies: Sulfa (sulfonamide antibiotics) [sulfa antibiotics]     Immunization History   Administered Date(s) Administered    COVID-19 12+ (2023-24) (MODERNA) 11/16/2023    COVID-19 Bivalent 12+ (Pfizer) 10/29/2022    COVID-19 Monovalent 18+ (Moderna) 02/19/2021, 03/19/2021, 10/22/2021, 04/28/2022    Pneumococcal 23 valent 02/13/2019    Zoster recombinant adjuvanted (SHINGRIX) 09/18/2020, 11/19/2020      Objective:     Wt Readings from Last 3 Encounters:   03/22/24 54.4 kg (120 lb)   02/06/24 55.8 kg (123 lb)   07/13/23 55.4 kg (122 lb 1.6 oz)     BP Readings from Last 3 Encounters:   03/22/24 134/68   02/06/24 (!) 144/80   07/13/23 (!) 175/84       PHYSICAL EXAM  /68 (BP Location: Right arm, Patient Position: Sitting, Cuff Size: Adult Regular)   Pulse 77   Temp 97.5  F (36.4  C) (Oral)   Resp 18   Ht 1.581 m (5' 2.25\")   Wt 54.4 kg (120 lb)   LMP  (LMP Unknown)   SpO2 94%   BMI 21.77 " kg/m     The patient is comfortable, no acute distress.  Mood good.  Insight is good.  No skin lesions or nodules of concern.  Ears clear.  Eyes are nonicteric.  Pupils equal and reactive.  Throat is clear.  Neck is supple without mass, no thyromegaly. No cervical or epitrochlear adenopathy.  Bimanual breast exam shows no nodules and no skin changes of concern.  No axillary lymph nodes. Heart regular rate and rhythm.  Lungs clear to auscultation bilaterally.  Respiratory effort good.  Abdomen soft and nontender.  No hepatosplenomegaly.  Extremities show no edema.      Diagnostics:     Office Visit on 03/03/2023   Component Date Value Ref Range Status    CK 03/03/2023 80  26 - 192 U/L Final    Cholesterol 03/03/2023 174  <200 mg/dL Final    Triglycerides 03/03/2023 70  <150 mg/dL Final    Direct Measure HDL 03/03/2023 88  >=50 mg/dL Final    LDL Cholesterol Calculated 03/03/2023 72  <=100 mg/dL Final    Non HDL Cholesterol 03/03/2023 86  <130 mg/dL Final    CRP Inflammation 03/03/2023 <3.00  <5.00 mg/L Final    Erythrocyte Sedimentation Rate 03/03/2023 31 (H)  0 - 20 mm/hr Final    Sodium 03/03/2023 134 (L)  136 - 145 mmol/L Final    Potassium 03/03/2023 4.1  3.4 - 5.3 mmol/L Final    Chloride 03/03/2023 95 (L)  98 - 107 mmol/L Final    Carbon Dioxide (CO2) 03/03/2023 27  22 - 29 mmol/L Final    Anion Gap 03/03/2023 12  7 - 15 mmol/L Final    Urea Nitrogen 03/03/2023 11.6  8.0 - 23.0 mg/dL Final    Creatinine 03/03/2023 0.60  0.51 - 0.95 mg/dL Final    Calcium 03/03/2023 9.6  8.2 - 9.6 mg/dL Final    Glucose 03/03/2023 101 (H)  70 - 99 mg/dL Final    Alkaline Phosphatase 03/03/2023 110 (H)  35 - 104 U/L Final    AST 03/03/2023 33  10 - 35 U/L Final    Specimen is hemolyzed which can falsely elevate AST. Analysis of a non-hemolyzed specimen may result in a lower value.    ALT 03/03/2023 23  10 - 35 U/L Final    Protein Total 03/03/2023 7.2  6.4 - 8.3 g/dL Final    Albumin 03/03/2023 4.3  3.5 - 5.2 g/dL Final     Bilirubin Total 03/03/2023 0.6  <=1.2 mg/dL Final    GFR Estimate 03/03/2023 84  >60 mL/min/1.73m2 Final    eGFR calculated using 2021 CKD-EPI equation.    WBC Count 03/03/2023 6.3  4.0 - 11.0 10e3/uL Final    RBC Count 03/03/2023 4.57  3.80 - 5.20 10e6/uL Final    Hemoglobin 03/03/2023 14.8  11.7 - 15.7 g/dL Final    Hematocrit 03/03/2023 43.9  35.0 - 47.0 % Final    MCV 03/03/2023 96  78 - 100 fL Final    MCH 03/03/2023 32.4  26.5 - 33.0 pg Final    MCHC 03/03/2023 33.7  31.5 - 36.5 g/dL Final    RDW 03/03/2023 12.4  10.0 - 15.0 % Final    Platelet Count 03/03/2023 273  150 - 450 10e3/uL Final    % Neutrophils 03/03/2023 69  % Final    % Lymphocytes 03/03/2023 22  % Final    % Monocytes 03/03/2023 7  % Final    % Eosinophils 03/03/2023 1  % Final    % Basophils 03/03/2023 1  % Final    % Immature Granulocytes 03/03/2023 0  % Final    Absolute Neutrophils 03/03/2023 4.4  1.6 - 8.3 10e3/uL Final    Absolute Lymphocytes 03/03/2023 1.4  0.8 - 5.3 10e3/uL Final    Absolute Monocytes 03/03/2023 0.4  0.0 - 1.3 10e3/uL Final    Absolute Eosinophils 03/03/2023 0.1  0.0 - 0.7 10e3/uL Final    Absolute Basophils 03/03/2023 0.0  0.0 - 0.2 10e3/uL Final    Absolute Immature Granulocytes 03/03/2023 0.0  <=0.4 10e3/uL Final       Results for orders placed or performed in visit on 03/22/24   CBC with platelets     Status: Normal   Result Value Ref Range    WBC Count 5.7 4.0 - 11.0 10e3/uL    RBC Count 4.42 3.80 - 5.20 10e6/uL    Hemoglobin 14.4 11.7 - 15.7 g/dL    Hematocrit 42.6 35.0 - 47.0 %    MCV 96 78 - 100 fL    MCH 32.6 26.5 - 33.0 pg    MCHC 33.8 31.5 - 36.5 g/dL    RDW 12.2 10.0 - 15.0 %    Platelet Count 266 150 - 450 10e3/uL       Assessment:     1. Medicare annual wellness visit, subsequent    2. Primary hypertension    3. Closed wedge compression fracture of T7 vertebra, initial encounter (H)    4. Mixed hyperlipidemia    5. OAB (overactive bladder)    6. Balance disorder         Plan:     Blood work done  today.  Likely will continue on medications as is.  Up-to-date on preventative care.  Continue current medications  Follow up sooner if issues.    Orders Placed This Encounter   Procedures    Hepatic function panel    Basic metabolic panel    Lipid panel reflex to direct LDL Fasting    Vitamin B12    CBC with platelets        A personalized health plan based on the identified health risks was provided to the patient on the AVS.       Rufino Fenton MD  General Internal Medicine  [facility]      Return in about 1 year (around 3/22/2025) for annual wellness visit.     No future appointments.      Health Maintenance   Topic Date Due    ANNUAL REVIEW OF  ORDERS  Never done    INFLUENZA VACCINE (1) Never done    RSV VACCINE (Pregnancy & 60+) (1 - 1-dose 60+ series) 12/13/2024 (Originally 8/11/1991)    MEDICARE ANNUAL WELLNESS VISIT  03/22/2025    FALL RISK ASSESSMENT  03/22/2025    LIPID  03/03/2026    ADVANCE CARE PLANNING  03/07/2028    DTAP/TDAP/TD IMMUNIZATION (2 - Td or Tdap) 03/04/2031    PHQ-2 (once per calendar year)  Completed    ZOSTER IMMUNIZATION  Completed    COVID-19 Vaccine  Completed    IPV IMMUNIZATION  Aged Out    HPV IMMUNIZATION  Aged Out    MENINGITIS IMMUNIZATION  Aged Out    RSV MONOCLONAL ANTIBODY  Aged Out    Pneumococcal Vaccine: 65+ Years  Discontinued        ______________________________________________________________________     Additional required elements:    I have reviewed Opioid Use Disorder and Substance Use Disorder risk factors and made any needed referrals.  This may not apply to this individual patient, but this documentation is required by Medicare.    Memory screen:      3/22/2024    10:22 AM   MINI COG   Clock Draw Score 2 Normal   3 Item Recall 3 objects recalled   Mini Cog Total Score 5        PHQ-2 Score:         3/22/2024     9:59 AM 2/6/2024     7:44 AM   PHQ-2 ( 1999 Pfizer)   Q1: Little interest or pleasure in doing things 0 0   Q2:  Feeling down, depressed or hopeless 0 0   PHQ-2 Score 0 0   Q1: Little interest or pleasure in doing things Not at all Not at all   Q2: Feeling down, depressed or hopeless Not at all Not at all   PHQ-2 Score 0 0        Advanced care planning reviewed.        3/22/2024   Fall Risk   Fallen 2 or more times in the past year? No   Trouble with walking or balance? No          3/22/2024   Substance Use   Alcohol more than 3/day or more than 7/wk Yes   How often do you have a drink containing alcohol 4 or more times a week   How many alcohol drinks on typical day 1 or 2   How often do you have 5+ drinks at one occasion Never   Audit 2/3 Score 0   How often not able to stop drinking once started Never   How often failed to do what normally expected Never   How often needed first drink in am after a heavy drinking session Never   How often feeling of guilt or remorse after drinking Never   How often unable to remember what happened the night before Never   Have you or someone else been injured because of your drinking No   Has anyone been concerned or suggested you cut down on drinking No   TOTAL SCORE - AUDIT 4   Do you have a current opioid prescription? No   How severe/bad is pain from 1 to 10? 0/10 (No Pain)   Do you use any other substances recreationally? (!) ALCOHOL       Last vision screening (if done):       No data to display

## 2024-03-24 ENCOUNTER — TELEPHONE (OUTPATIENT)
Dept: INTERNAL MEDICINE | Facility: CLINIC | Age: 89
End: 2024-03-24
Payer: OTHER GOVERNMENT

## 2024-03-26 ENCOUNTER — TRANSFERRED RECORDS (OUTPATIENT)
Dept: HEALTH INFORMATION MANAGEMENT | Facility: CLINIC | Age: 89
End: 2024-03-26
Payer: OTHER GOVERNMENT

## 2024-04-03 DIAGNOSIS — N32.81 OAB (OVERACTIVE BLADDER): ICD-10-CM

## 2024-04-03 RX ORDER — SOLIFENACIN SUCCINATE 10 MG/1
10 TABLET, FILM COATED ORAL DAILY
Qty: 90 TABLET | Refills: 2 | Status: SHIPPED | OUTPATIENT
Start: 2024-04-03

## 2024-06-29 DIAGNOSIS — I10 ESSENTIAL HYPERTENSION: ICD-10-CM

## 2024-06-29 DIAGNOSIS — E78.2 MIXED HYPERLIPIDEMIA: ICD-10-CM

## 2024-06-30 RX ORDER — ROSUVASTATIN CALCIUM 5 MG/1
5 TABLET, COATED ORAL
Qty: 48 TABLET | Refills: 3 | Status: SHIPPED | OUTPATIENT
Start: 2024-06-30 | End: 2025-06-25

## 2024-06-30 RX ORDER — AMLODIPINE AND BENAZEPRIL HYDROCHLORIDE 5; 20 MG/1; MG/1
1 CAPSULE ORAL DAILY
Qty: 90 CAPSULE | Refills: 3 | Status: SHIPPED | OUTPATIENT
Start: 2024-06-30

## 2024-07-25 ENCOUNTER — TELEPHONE (OUTPATIENT)
Dept: INTERNAL MEDICINE | Facility: CLINIC | Age: 89
End: 2024-07-25
Payer: OTHER GOVERNMENT

## 2024-07-25 NOTE — TELEPHONE ENCOUNTER
Reviewed and noted.    Rufino Fenton MD  General Internal Medicine  [facility]  7/25/2024, 3:59 PM

## 2024-07-25 NOTE — TELEPHONE ENCOUNTER
"Patient calling clinic to report a fall she had yesterday late afternoon/early evening while at the store. She said she meant to turn to the right and fell backwards and landed on her \"bum and back\". She says it hurts in the center of her back right above the hips when she bends or reaches for something. She sometimes feels the pain with certain movements on the left side of her back in between her armpit and waist. She is taking Tylenol for the discomfort. She says it is a sharp pain but goes away as soon as she stops doing the movement. She denies any skin tears and has a couple of little bruises on her buttocks. She says she feels much better today in comparison to yesterday after it happened in regards to the pain. She is able to go about her normal activities (she went shopping today) but she is moving a little slower. She is calling to notify her PCP of the fall. She will call back if not improving or worsening.  " [facility] sent Rx request for the following:      Requested Prescriptions   Pending Prescriptions Disp Refills     LORazepam (ATIVAN) 0.5 MG tablet 10 tablet 0     Sig: Take 1 tablet (0.5 mg) by mouth daily as needed (flying phobia)   Last Prescription Date:   2/14/22  Last Fill Qty/Refills:         10, R-0    Last Office Visit:              2/14/22   Future Office visit:           None    In clinical absence of patient's primary, Chuck Galaviz, patient is requesting that this message be sent to the covering provider for consideration please.    Roxanne Butler RN .............. 6/24/2022  9:59 AM

## 2024-08-15 ENCOUNTER — TELEPHONE (OUTPATIENT)
Dept: INTERNAL MEDICINE | Facility: CLINIC | Age: 89
End: 2024-08-15
Payer: OTHER GOVERNMENT

## 2024-08-15 NOTE — TELEPHONE ENCOUNTER
Pt calling, stating she is moving her Primary Care to somewhere closer to home due to her daughters not wanting to drive to [facility] for pts appts.

## 2025-03-17 DIAGNOSIS — N32.81 OAB (OVERACTIVE BLADDER): ICD-10-CM

## 2025-03-17 RX ORDER — SOLIFENACIN SUCCINATE 10 MG/1
10 TABLET, FILM COATED ORAL DAILY
Qty: 30 TABLET | Refills: 2 | Status: SHIPPED | OUTPATIENT
Start: 2025-03-17

## 2025-04-26 ENCOUNTER — HEALTH MAINTENANCE LETTER (OUTPATIENT)
Age: OVER 89
End: 2025-04-26

## 2025-05-26 NOTE — TELEPHONE ENCOUNTER
Left message to call back.    If pt calls back please inform pt of Dr. Fenton's message and assist as needed.    
Patient reports she fell about 3 weeks ago on a Sunday night.  She is having some left wrist pain and would like to see PCP.  Would like to know if Dr. Fenton can see her next week or what he recommends she do?         
Scheduled pt for 1/31/2022 at 2:15.  She thanked for the call.  
See if she wants to be seen at 2:15 pm next Monday.    Rufino Fenton MD  General Internal Medicine  [facility]  1/28/2022, 11:45 AM    
MED